# Patient Record
Sex: MALE | Race: WHITE | Employment: OTHER | URBAN - METROPOLITAN AREA
[De-identification: names, ages, dates, MRNs, and addresses within clinical notes are randomized per-mention and may not be internally consistent; named-entity substitution may affect disease eponyms.]

---

## 2019-01-07 ENCOUNTER — HOSPITAL ENCOUNTER (OUTPATIENT)
Dept: LAB | Age: 69
Discharge: HOME | End: 2019-01-07

## 2019-01-07 PROCEDURE — 88305 TISSUE EXAM BY PATHOLOGIST: CPT

## 2021-04-22 ENCOUNTER — APPOINTMENT (OUTPATIENT)
Dept: MRI IMAGING | Age: 71
DRG: 066 | End: 2021-04-22
Attending: PSYCHIATRY & NEUROLOGY
Payer: MEDICARE

## 2021-04-22 ENCOUNTER — APPOINTMENT (OUTPATIENT)
Dept: CT IMAGING | Age: 71
DRG: 066 | End: 2021-04-22
Attending: EMERGENCY MEDICINE
Payer: MEDICARE

## 2021-04-22 ENCOUNTER — HOSPITAL ENCOUNTER (INPATIENT)
Age: 71
LOS: 1 days | Discharge: HOME | DRG: 066 | End: 2021-04-23
Attending: EMERGENCY MEDICINE | Admitting: INTERNAL MEDICINE
Payer: MEDICARE

## 2021-04-22 DIAGNOSIS — R29.90 STROKE-LIKE SYMPTOMS: ICD-10-CM

## 2021-04-22 DIAGNOSIS — R42 DIZZINESS: ICD-10-CM

## 2021-04-22 DIAGNOSIS — R27.0 ATAXIA: Primary | ICD-10-CM

## 2021-04-22 DIAGNOSIS — I63.9 ACUTE STROKE OF MEDULLA OBLONGATA (HCC): ICD-10-CM

## 2021-04-22 DIAGNOSIS — I10 ESSENTIAL HYPERTENSION: ICD-10-CM

## 2021-04-22 LAB
ANION GAP SERPL CALC-SCNC: 4 MMOL/L (ref 7–16)
ATRIAL RATE: 57 BPM
BASOPHILS # BLD: 0 K/UL (ref 0–0.2)
BASOPHILS NFR BLD: 0 % (ref 0–2)
BUN SERPL-MCNC: 26 MG/DL (ref 8–23)
CALCIUM SERPL-MCNC: 9.4 MG/DL (ref 8.3–10.4)
CALCULATED P AXIS, ECG09: 73 DEGREES
CALCULATED R AXIS, ECG10: -11 DEGREES
CALCULATED T AXIS, ECG11: 56 DEGREES
CHLORIDE SERPL-SCNC: 109 MMOL/L (ref 98–107)
CO2 SERPL-SCNC: 29 MMOL/L (ref 21–32)
CREAT SERPL-MCNC: 0.96 MG/DL (ref 0.8–1.5)
DIAGNOSIS, 93000: NORMAL
DIFFERENTIAL METHOD BLD: ABNORMAL
EOSINOPHIL # BLD: 0.1 K/UL (ref 0–0.8)
EOSINOPHIL NFR BLD: 2 % (ref 0.5–7.8)
ERYTHROCYTE [DISTWIDTH] IN BLOOD BY AUTOMATED COUNT: 12.8 % (ref 11.9–14.6)
GLUCOSE BLD STRIP.AUTO-MCNC: 74 MG/DL (ref 65–100)
GLUCOSE SERPL-MCNC: 84 MG/DL (ref 65–100)
HCT VFR BLD AUTO: 45.4 % (ref 41.1–50.3)
HGB BLD-MCNC: 15.4 G/DL (ref 13.6–17.2)
IMM GRANULOCYTES # BLD AUTO: 0 K/UL (ref 0–0.5)
IMM GRANULOCYTES NFR BLD AUTO: 0 % (ref 0–5)
INR BLD: 1 (ref 0.9–1.2)
INR PPP: 1
LYMPHOCYTES # BLD: 1.9 K/UL (ref 0.5–4.6)
LYMPHOCYTES NFR BLD: 40 % (ref 13–44)
MCH RBC QN AUTO: 30 PG (ref 26.1–32.9)
MCHC RBC AUTO-ENTMCNC: 33.9 G/DL (ref 31.4–35)
MCV RBC AUTO: 88.5 FL (ref 79.6–97.8)
MONOCYTES # BLD: 0.6 K/UL (ref 0.1–1.3)
MONOCYTES NFR BLD: 13 % (ref 4–12)
NEUTS SEG # BLD: 2.1 K/UL (ref 1.7–8.2)
NEUTS SEG NFR BLD: 45 % (ref 43–78)
NRBC # BLD: 0 K/UL (ref 0–0.2)
P-R INTERVAL, ECG05: 206 MS
PLATELET # BLD AUTO: 186 K/UL (ref 150–450)
PMV BLD AUTO: 9.6 FL (ref 9.4–12.3)
POTASSIUM SERPL-SCNC: 4.1 MMOL/L (ref 3.5–5.1)
PROTHROMBIN TIME: 13.4 SEC (ref 12.5–14.7)
PT BLD: 12.4 SECS (ref 9.6–11.6)
Q-T INTERVAL, ECG07: 410 MS
QRS DURATION, ECG06: 92 MS
QTC CALCULATION (BEZET), ECG08: 399 MS
RBC # BLD AUTO: 5.13 M/UL (ref 4.23–5.6)
SERVICE CMNT-IMP: NORMAL
SODIUM SERPL-SCNC: 142 MMOL/L (ref 138–145)
T4 FREE SERPL-MCNC: 0.7 NG/DL (ref 0.78–1.46)
TROPONIN-HIGH SENSITIVITY: 5.5 PG/ML (ref 0–14)
TSH SERPL DL<=0.005 MIU/L-ACNC: 0.95 UIU/ML (ref 0.36–3.74)
VENTRICULAR RATE, ECG03: 57 BPM
WBC # BLD AUTO: 4.8 K/UL (ref 4.3–11.1)

## 2021-04-22 PROCEDURE — 70498 CT ANGIOGRAPHY NECK: CPT

## 2021-04-22 PROCEDURE — 74011000258 HC RX REV CODE- 258: Performed by: EMERGENCY MEDICINE

## 2021-04-22 PROCEDURE — 99285 EMERGENCY DEPT VISIT HI MDM: CPT

## 2021-04-22 PROCEDURE — 82962 GLUCOSE BLOOD TEST: CPT

## 2021-04-22 PROCEDURE — 93005 ELECTROCARDIOGRAM TRACING: CPT | Performed by: EMERGENCY MEDICINE

## 2021-04-22 PROCEDURE — 74011250637 HC RX REV CODE- 250/637: Performed by: INTERNAL MEDICINE

## 2021-04-22 PROCEDURE — 74011250636 HC RX REV CODE- 250/636: Performed by: INTERNAL MEDICINE

## 2021-04-22 PROCEDURE — 85610 PROTHROMBIN TIME: CPT

## 2021-04-22 PROCEDURE — C8929 TTE W OR WO FOL WCON,DOPPLER: HCPCS

## 2021-04-22 PROCEDURE — APPNB45 APP NON BILLABLE 31-45 MINUTES: Performed by: NURSE PRACTITIONER

## 2021-04-22 PROCEDURE — 70450 CT HEAD/BRAIN W/O DYE: CPT

## 2021-04-22 PROCEDURE — 84484 ASSAY OF TROPONIN QUANT: CPT

## 2021-04-22 PROCEDURE — 74011000636 HC RX REV CODE- 636: Performed by: EMERGENCY MEDICINE

## 2021-04-22 PROCEDURE — 36415 COLL VENOUS BLD VENIPUNCTURE: CPT

## 2021-04-22 PROCEDURE — 4A03X5D MEASUREMENT OF ARTERIAL FLOW, INTRACRANIAL, EXTERNAL APPROACH: ICD-10-PCS | Performed by: RADIOLOGY

## 2021-04-22 PROCEDURE — 84443 ASSAY THYROID STIM HORMONE: CPT

## 2021-04-22 PROCEDURE — 2709999900 HC NON-CHARGEABLE SUPPLY

## 2021-04-22 PROCEDURE — 70551 MRI BRAIN STEM W/O DYE: CPT

## 2021-04-22 PROCEDURE — 85025 COMPLETE CBC W/AUTO DIFF WBC: CPT

## 2021-04-22 PROCEDURE — 84439 ASSAY OF FREE THYROXINE: CPT

## 2021-04-22 PROCEDURE — 80048 BASIC METABOLIC PNL TOTAL CA: CPT

## 2021-04-22 PROCEDURE — 65660000000 HC RM CCU STEPDOWN

## 2021-04-22 PROCEDURE — 74011000250 HC RX REV CODE- 250: Performed by: INTERNAL MEDICINE

## 2021-04-22 PROCEDURE — 74011250637 HC RX REV CODE- 250/637: Performed by: EMERGENCY MEDICINE

## 2021-04-22 PROCEDURE — 99223 1ST HOSP IP/OBS HIGH 75: CPT | Performed by: PSYCHIATRY & NEUROLOGY

## 2021-04-22 RX ORDER — SODIUM CHLORIDE 0.9 % (FLUSH) 0.9 %
5-40 SYRINGE (ML) INJECTION EVERY 8 HOURS
Status: DISCONTINUED | OUTPATIENT
Start: 2021-04-22 | End: 2021-04-23 | Stop reason: HOSPADM

## 2021-04-22 RX ORDER — ACETAMINOPHEN 325 MG/1
650 TABLET ORAL
Status: DISCONTINUED | OUTPATIENT
Start: 2021-04-22 | End: 2021-04-22

## 2021-04-22 RX ORDER — SODIUM CHLORIDE 0.9 % (FLUSH) 0.9 %
10 SYRINGE (ML) INJECTION
Status: COMPLETED | OUTPATIENT
Start: 2021-04-22 | End: 2021-04-22

## 2021-04-22 RX ORDER — ACETAMINOPHEN 325 MG/1
1300 TABLET ORAL 3 TIMES DAILY
COMMUNITY

## 2021-04-22 RX ORDER — SODIUM CHLORIDE 0.9 % (FLUSH) 0.9 %
5-40 SYRINGE (ML) INJECTION AS NEEDED
Status: DISCONTINUED | OUTPATIENT
Start: 2021-04-22 | End: 2021-04-23 | Stop reason: HOSPADM

## 2021-04-22 RX ORDER — ONDANSETRON 2 MG/ML
4 INJECTION INTRAMUSCULAR; INTRAVENOUS
Status: DISCONTINUED | OUTPATIENT
Start: 2021-04-22 | End: 2021-04-23 | Stop reason: HOSPADM

## 2021-04-22 RX ORDER — CLOPIDOGREL BISULFATE 75 MG/1
300 TABLET ORAL
Status: COMPLETED | OUTPATIENT
Start: 2021-04-22 | End: 2021-04-22

## 2021-04-22 RX ORDER — LORATADINE 10 MG/1
10 TABLET ORAL DAILY
Status: DISCONTINUED | OUTPATIENT
Start: 2021-04-23 | End: 2021-04-23 | Stop reason: HOSPADM

## 2021-04-22 RX ORDER — ACETAMINOPHEN 650 MG/1
650 SUPPOSITORY RECTAL
Status: DISCONTINUED | OUTPATIENT
Start: 2021-04-22 | End: 2021-04-22

## 2021-04-22 RX ORDER — POLYETHYLENE GLYCOL 3350 17 G/17G
17 POWDER, FOR SOLUTION ORAL DAILY PRN
Status: DISCONTINUED | OUTPATIENT
Start: 2021-04-22 | End: 2021-04-23 | Stop reason: HOSPADM

## 2021-04-22 RX ORDER — HYDRALAZINE HYDROCHLORIDE 20 MG/ML
20 INJECTION INTRAMUSCULAR; INTRAVENOUS
Status: DISCONTINUED | OUTPATIENT
Start: 2021-04-22 | End: 2021-04-23 | Stop reason: HOSPADM

## 2021-04-22 RX ORDER — GUAIFENESIN 100 MG/5ML
81 LIQUID (ML) ORAL ONCE
Status: COMPLETED | OUTPATIENT
Start: 2021-04-22 | End: 2021-04-22

## 2021-04-22 RX ORDER — SODIUM CHLORIDE 0.9 % (FLUSH) 0.9 %
5-40 SYRINGE (ML) INJECTION AS NEEDED
Status: DISCONTINUED | OUTPATIENT
Start: 2021-04-22 | End: 2021-04-23

## 2021-04-22 RX ORDER — SODIUM CHLORIDE 0.9 % (FLUSH) 0.9 %
5-40 SYRINGE (ML) INJECTION EVERY 8 HOURS
Status: DISCONTINUED | OUTPATIENT
Start: 2021-04-22 | End: 2021-04-23

## 2021-04-22 RX ORDER — ATORVASTATIN CALCIUM 40 MG/1
40 TABLET, FILM COATED ORAL
Status: DISCONTINUED | OUTPATIENT
Start: 2021-04-22 | End: 2021-04-22 | Stop reason: SDUPTHER

## 2021-04-22 RX ORDER — AMLODIPINE BESYLATE 2.5 MG/1
2.5 TABLET ORAL DAILY
Status: ON HOLD | COMMUNITY
End: 2021-04-23 | Stop reason: SDUPTHER

## 2021-04-22 RX ORDER — GUAIFENESIN 100 MG/5ML
81 LIQUID (ML) ORAL DAILY
Status: DISCONTINUED | OUTPATIENT
Start: 2021-04-23 | End: 2021-04-23 | Stop reason: HOSPADM

## 2021-04-22 RX ORDER — GUAIFENESIN 100 MG/5ML
81 LIQUID (ML) ORAL DAILY
Status: DISCONTINUED | OUTPATIENT
Start: 2021-04-23 | End: 2021-04-22

## 2021-04-22 RX ORDER — ACETAMINOPHEN 325 MG/1
1300 TABLET ORAL 3 TIMES DAILY
Status: DISCONTINUED | OUTPATIENT
Start: 2021-04-22 | End: 2021-04-23 | Stop reason: HOSPADM

## 2021-04-22 RX ORDER — ATORVASTATIN CALCIUM 40 MG/1
40 TABLET, FILM COATED ORAL
Status: DISCONTINUED | OUTPATIENT
Start: 2021-04-22 | End: 2021-04-23 | Stop reason: HOSPADM

## 2021-04-22 RX ORDER — DICLOFENAC SODIUM 10 MG/G
1 GEL TOPICAL 4 TIMES DAILY
COMMUNITY

## 2021-04-22 RX ORDER — PROMETHAZINE HYDROCHLORIDE 25 MG/1
12.5 TABLET ORAL
Status: DISCONTINUED | OUTPATIENT
Start: 2021-04-22 | End: 2021-04-23 | Stop reason: HOSPADM

## 2021-04-22 RX ADMIN — ASPIRIN 81 MG: 81 TABLET, CHEWABLE ORAL at 13:42

## 2021-04-22 RX ADMIN — ATORVASTATIN CALCIUM 40 MG: 40 TABLET, FILM COATED ORAL at 22:20

## 2021-04-22 RX ADMIN — PERFLUTREN 1 ML: 6.52 INJECTION, SUSPENSION INTRAVENOUS at 16:00

## 2021-04-22 RX ADMIN — Medication 10 ML: at 22:21

## 2021-04-22 RX ADMIN — ACETAMINOPHEN 1300 MG: 325 TABLET, FILM COATED ORAL at 17:20

## 2021-04-22 RX ADMIN — ACETAMINOPHEN 1300 MG: 325 TABLET, FILM COATED ORAL at 22:20

## 2021-04-22 RX ADMIN — Medication 10 ML: at 17:20

## 2021-04-22 RX ADMIN — SODIUM CHLORIDE 100 ML: 900 INJECTION, SOLUTION INTRAVENOUS at 12:01

## 2021-04-22 RX ADMIN — Medication 10 ML: at 17:21

## 2021-04-22 RX ADMIN — IOPAMIDOL 50 ML: 755 INJECTION, SOLUTION INTRAVENOUS at 12:01

## 2021-04-22 RX ADMIN — CLOPIDOGREL BISULFATE 300 MG: 75 TABLET ORAL at 12:29

## 2021-04-22 RX ADMIN — Medication 10 ML: at 12:01

## 2021-04-22 NOTE — PROGRESS NOTES
04/22/21 1922   NIH Stroke Scale   Interval Other (comment)  (NIH at bedside w/ Ailyn Skaggs RN)   LOC 0   LOC Questions 0   LOC Commands 0   Best Gaze 0   Visual 0   Facial Palsy 0   Motor Right Arm 0   Motor Left Arm 0   Motor Right Leg 0   Motor Left Leg 0   Limb Ataxia 0   Sensory 0   Best Language 0   Dysarthria 0   Extinction and Inattention 0   Total 0

## 2021-04-22 NOTE — ED NOTES
TRANSFER - OUT REPORT:    Verbal report given to Joyce(name) on David Orozco  being transferred to 726(unit) for routine progression of care       Report consisted of patients Situation, Background, Assessment and   Recommendations(SBAR). Information from the following report(s) SBAR, ED Summary, [facility] and Recent Results was reviewed with the receiving nurse. Lines:   Peripheral IV 04/22/21 Left Antecubital (Active)        Opportunity for questions and clarification was provided.       Patient transported with:   Monitor  Registered Nurse

## 2021-04-22 NOTE — PROGRESS NOTES
04/22/21 1428   NIH Stroke Scale   Interval Other (comment)  (NIH at bedside w/ CINDY Anguiano)   LOC 0   LOC Questions 0   LOC Commands 0   Best Gaze 0   Visual 0   Facial Palsy 0   Motor Right Arm 0   Motor Left Arm 0   Motor Right Leg 0   Motor Left Leg 0   Limb Ataxia 0   Sensory 0   Best Language 0   Dysarthria 0   Extinction and Inattention 0   Total 0

## 2021-04-22 NOTE — PROGRESS NOTES
TRANSFER - IN REPORT:    Verbal report received from Shital Choi RN on [facility]  being received from ED for routine progression of care      Report consisted of patients Situation, Background, Assessment and   Recommendations(SBAR). Information from the following report(s) SBAR was reviewed with the receiving nurse. Opportunity for questions and clarification was provided. Assessment completed upon patients arrival to unit and care assumed. Virtual Regular Visit      Assessment/Plan:    Problem List Items Addressed This Visit        Respiratory    Mild intermittent asthma without complication - Primary     D/w pt that asthma is NOT a contraindication to wear a mask and is actually a dx that we should stress she should wear a mask, she has tried mult types of mask but feels she cannot breath when it covers her nose, work will allow her to wear a face mask, d/w pt that face masks alone are not as good as mask but I would allow her to wear a face mask WITH a face shield, forms filled out and will be faxed, call with any new/worse resp symptoms                    Reason for visit is   Chief Complaint   Patient presents with    Follow-up    Virtual Regular Visit        Encounter provider Jose D Hannah DO    Provider located at [address] [place] [address]  [address] [place] [address]      Recent Visits  No visits were found meeting these conditions  Showing recent visits within past 7 days and meeting all other requirements     Future Appointments  No visits were found meeting these conditions  Showing future appointments within next 150 days and meeting all other requirements        The patient was identified by name and date of birth  Camron Mcmillan was informed that this is a telemedicine visit and that the visit is being conducted through [employer] and patient was informed that this is not a secure, HIPAA-compliant platform  She agrees to proceed     My office door was closed  No one else was in the room  She acknowledged consent and understanding of privacy and security of the video platform  The patient has agreed to participate and understands they can discontinue the visit at any time  Patient is aware this is a billable service       Subjective  Camron Mcmillan is a 54 y o  female contacted via [employer] to go over questions in regards to disability forms that were dropped off since our last visit       HPI Last visit 3/23/21 pt was noting worsening acute on chronic LBP w/o sciatica or radicular symptoms  We subsequently increased her Lyrica to 75 mg bid and advised her to con't her Cymbalta  A trial of Skelaxin was given  Xray of hip/pelvix and lumbar spine was ordered  Pt was given number to pain mgt as well  The next day after our visit we received a fax from pts work regarding disability  Disability was not discussed at previous appt  Pt contacted today to review disability forms and to discuss further  Pt states the disability forms are d/t her inability to wear a mask d/t her asthma  She states she was dx with asthma as a teen  She states she had PFTs done in the past  She states her asthma is only an issue with illness or exercise  She feels when she wears a mask at [facility] and the mask covers her nose it makes her "asthma flare up"  She can tolerate the mask over just her mouth but company policy will not allow this  She only tx her asthma with a rescue inhaler prn        Past Medical History:   Diagnosis Date    Anxiety     Arthritis     Asthma     Depression     Disc disorder     Fibromyalgia     Hypertension     Hypothyroidism     Migraine        Past Surgical History:   Procedure Laterality Date    BACK SURGERY      CERVICAL BIOPSY  W/ LOOP ELECTRODE EXCISION      CERVICAL FUSION       SECTION      CHOLECYSTECTOMY      FOOT SURGERY Bilateral     KNEE SURGERY      TUBAL LIGATION Bilateral        Current Outpatient Medications   Medication Sig Dispense Refill    albuterol (VENTOLIN HFA) 90 mcg/act inhaler Inhale 2 puffs every 6 (six) hours as needed for wheezing 18 g 0    amLODIPine (NORVASC) 10 mg tablet Take 1 tablet (10 mg total) by mouth daily 90 tablet 1    Aspirin-Acetaminophen-Caffeine (EXCEDRIN PO) Take by mouth      cholecalciferol (VITAMIN D3) 1,000 units tablet Take 2,000 Units by mouth daily      DULoxetine (CYMBALTA) 60 mg delayed release capsule Take 1 capsule (60 mg total) by mouth daily 90 capsule 0    famotidine (PEPCID) 20 mg tablet Take 1 tablet (20 mg total) by mouth 2 (two) times a day 180 tablet 1    levothyroxine 200 mcg tablet Take 1 tablet (200 mcg total) by mouth daily 90 tablet 0    levothyroxine 25 mcg tablet 25 mcg 1 tab PO q am with 200 mcg on Mon, Wed, Fri, Sun ONLY , con't 200 mcg all other days 90 tablet 0    metaxalone (Skelaxin) 800 mg tablet Take 1 tablet (800 mg total) by mouth 3 (three) times a day as needed for muscle spasms 30 tablet 1    multivitamin (THERAGRAN) TABS Take 1 tablet by mouth daily      ondansetron (ZOFRAN-ODT) 4 mg disintegrating tablet Take 1 tablet (4 mg total) by mouth every 8 (eight) hours as needed for nausea or vomiting 12 tablet 0    pregabalin (LYRICA) 75 mg capsule Take 1 in AM and 2 PO HS  90 capsule 1     No current facility-administered medications for this visit  Allergies   Allergen Reactions    Acetaminophen-Codeine      Pt states she does not remember having a reaction to this medication    Hydrocodone Other (See Comments)    Latex Hives     Latex powder      Prednisolone        Review of Systems   Constitutional: Negative for chills and fever  Respiratory: Negative for cough and shortness of breath  Musculoskeletal: Positive for arthralgias and back pain  Video Exam    Vitals:    03/30/21 1733 03/30/21 1740   BP:  126/86   Temp:  (!) 97 4 °F (36 3 °C)   Weight: 127 kg (280 lb)    Height: 5' 10" (1 778 m)        Physical Exam  Vitals signs and nursing note reviewed  Constitutional:       General: She is not in acute distress  Appearance: She is not ill-appearing  HENT:      Head: Normocephalic  Pulmonary:      Effort: Pulmonary effort is normal  No respiratory distress  Skin:     Coloration: Skin is not pale  Findings: No rash  Psychiatric:         Mood and Affect: Mood normal          Behavior: Behavior normal          Thought Content:  Thought content normal          Judgment: Judgment normal           I spent 10 minutes with patient today in which greater than 50% of the time was spent in counseling/coordination of care regarding asthma and mask recommendations      VIRTUAL VISIT DISCLAIMER    Houston Dave acknowledges that she has consented to an online visit or consultation  She understands that the online visit is based solely on information provided by her, and that, in the absence of a face-to-face physical evaluation by the physician, the diagnosis she receives is both limited and provisional in terms of accuracy and completeness  This is not intended to replace a full medical face-to-face evaluation by the physician  Dario Acosta understands and accepts these terms

## 2021-04-22 NOTE — ED PROVIDER NOTES
76-year-old male presents with intermittent episodes of dizziness for the past 3 days. He woke up in the morning 2 days neuro with dizziness, but it resolved after walking around for a while. He went to bed around 10 PM last night feeling normal.  He woke up at 6 AM with dizziness and difficulty walking. It has not gone away. He denies headache, chest pain, shortness of breath. No symptoms while sitting. Denies history of vertigo. He was told he had a TIA several years ago with similar symptoms. He was seen in urgent care and sent immediately to the emergency department this morning. Denies focal numbness or weakness. Dizziness  Pertinent negatives include no speech difficulty. Pertinent negatives include no shortness of breath, no chest pain, no vomiting, no confusion, no headaches and no nausea. Past Medical History:   Diagnosis Date    Hypertension     Other Ill-Defined Conditions     high cholesterol       Past Surgical History:   Procedure Laterality Date    ABDOMEN SURGERY PROC UNLISTED      henia repair    HX ORTHOPAEDIC      right knee, back    HX PROSTATECTOMY           No family history on file.     Social History     Socioeconomic History    Marital status:      Spouse name: Not on file    Number of children: Not on file    Years of education: Not on file    Highest education level: Not on file   Occupational History    Not on file   Social Needs    Financial resource strain: Not on file    Food insecurity     Worry: Not on file     Inability: Not on file    Transportation needs     Medical: Not on file     Non-medical: Not on file   Tobacco Use    Smoking status: Never Smoker   Substance and Sexual Activity    Alcohol use: No    Drug use: No    Sexual activity: Never   Lifestyle    Physical activity     Days per week: Not on file     Minutes per session: Not on file    Stress: Not on file   Relationships    Social connections     Talks on phone: Not on file Gets together: Not on file     Attends [employer] service: Not on file     Active member of club or organization: Not on file     Attends meetings of clubs or organizations: Not on file     Relationship status: Not on file    Intimate partner violence     Fear of current or ex partner: Not on file     Emotionally abused: Not on file     Physically abused: Not on file     Forced sexual activity: Not on file   Other Topics Concern    Not on file   Social History Narrative    Not on file         ALLERGIES: Patient has no known allergies. Review of Systems   Constitutional: Negative for fever. HENT: Negative for hearing loss. Eyes: Negative for visual disturbance. Respiratory: Negative for cough and shortness of breath. Cardiovascular: Negative for chest pain. Gastrointestinal: Negative for abdominal pain, diarrhea, nausea and vomiting. Musculoskeletal: Negative for back pain. Skin: Negative for rash. Neurological: Positive for dizziness. Negative for syncope, speech difficulty, weakness, numbness and headaches. Psychiatric/Behavioral: Negative for confusion. All other systems reviewed and are negative. Vitals:    04/22/21 1144   BP: (!) 156/85   Pulse: 65   Resp: 16   Temp: 98.8 °F (37.1 °C)   SpO2: 98%   Weight: 90.3 kg (199 lb)   Height: 5' 7\" (1.702 m)            Physical Exam  Vitals signs and nursing note reviewed. Constitutional:       Appearance: Normal appearance. He is well-developed. HENT:      Head: Normocephalic and atraumatic. Nose: Nose normal.      Mouth/Throat:      Mouth: Mucous membranes are moist.   Eyes:      Extraocular Movements: Extraocular movements intact. Right eye: No nystagmus. Left eye: No nystagmus. Pupils: Pupils are equal, round, and reactive to light. Neck:      Musculoskeletal: Normal range of motion and neck supple. Cardiovascular:      Rate and Rhythm: Regular rhythm. Heart sounds: Normal heart sounds.    Pulmonary: Effort: Pulmonary effort is normal.      Breath sounds: Normal breath sounds. Abdominal:      Palpations: Abdomen is soft. Tenderness: There is no abdominal tenderness. Musculoskeletal: Normal range of motion. General: No deformity. Skin:     General: Skin is warm and dry. Neurological:      General: No focal deficit present. Mental Status: He is alert. Mental status is at baseline. Cranial Nerves: No cranial nerve deficit. Sensory: No sensory deficit. Motor: Motor function is intact. Coordination: Coordination abnormal. Finger-Nose-Finger Test normal.      Gait: Gait abnormal.   Psychiatric:         Mood and Affect: Mood normal.         Behavior: Behavior normal.          MDM  Number of Diagnoses or Management Options  Diagnosis management comments: Parts of this document were created using dragon voice recognition software. The chart has been reviewed but errors may still be present. I wore appropriate PPE throughout this patient's ED visit. Rayo Clement MD, 11:51 AM    Code stroke called    12:19 PM  CT neg for acute CVA. NOT a TPA candidate due to onset time out of window. Evaluated by neuro, Dr Indu Rivera. Recommended hospitalist admission.        Amount and/or Complexity of Data Reviewed  Clinical lab tests: reviewed and ordered (Results for orders placed or performed during the hospital encounter of 04/22/21  -CBC WITH AUTOMATED DIFF       Result                      Value             Ref Range           WBC                         4.8               4.3 - 11.1 K*       RBC                         5.13              4.23 - 5.6 M*       HGB                         15.4              13.6 - 17.2 *       HCT                         45.4              41.1 - 50.3 %       MCV                         88.5              79.6 - 97.8 *       MCH                         30.0              26.1 - 32.9 *       MCHC                        33.9              31.4 - 35.0 * RDW                         12.8              11.9 - 14.6 %       PLATELET                    186               150 - 450 K/*       MPV                         9.6               9.4 - 12.3 FL       ABSOLUTE NRBC               0.00              0.0 - 0.2 K/*       DF                          AUTOMATED                             NEUTROPHILS                 45                43 - 78 %           LYMPHOCYTES                 40                13 - 44 %           MONOCYTES                   13 (H)            4.0 - 12.0 %        EOSINOPHILS                 2                 0.5 - 7.8 %         BASOPHILS                   0                 0.0 - 2.0 %         IMMATURE GRANULOCYTES       0                 0.0 - 5.0 %         ABS. NEUTROPHILS            2.1               1.7 - 8.2 K/*       ABS. LYMPHOCYTES            1.9               0.5 - 4.6 K/*       ABS. MONOCYTES              0.6               0.1 - 1.3 K/*       ABS. EOSINOPHILS            0.1               0.0 - 0.8 K/*       ABS. BASOPHILS              0.0               0.0 - 0.2 K/*       ABS. IMM. GRANS.            0.0               0.0 - 0.5 K/*  -POC PT/INR       Result                      Value             Ref Range           Prothrombin time (POC)      12.4 (H)          9.6 - 11.6 S*       INR (POC)                   1.0               0.9 - 1.2      -GLUCOSE, POC       Result                      Value             Ref Range           Glucose (POC)               74                65 - 100 mg/*       Performed by                                                  Tamara Brasher  )  Tests in the radiology section of CPT®: ordered and reviewed (Ct Code Neuro Head Wo Contrast    Result Date: 4/22/2021  History: patient with acute neuro changes. Dizziness, lightheadedness. Exam: CT head without contrast Technique: Thin section axial CT images were obtained from the skullbase through the vertex. Radiation dose reduction techniques were used for this study.   Our CT scanners use one or all of the following: Automated exposure control, adjustment of the mA and/or kV according to patient size, use of iterative reconstruction. COMPARISON: 10/3/2009 Findings: The ventricles are normal in size, shape, and position. Chronic appearing white matter change noted in the corona radiata and centrum semiovale, mild. No extra-axial fluid collection is present. There is no mass or mass-effect. The basilar cisterns are patent. The paranasal sinuses and mastoid air cells are clear.      Chronic appearing white matter change without acute intracranial abnormality.     )           Critical Care  Performed by: Edward Sweeney MD  Authorized by: Edward Sweeney MD     Critical care provider statement:     Critical care time (minutes):  30    Critical care time was exclusive of:  Separately billable procedures and treating other patients    Critical care was necessary to treat or prevent imminent or life-threatening deterioration of the following conditions:  CNS failure or compromise    Critical care was time spent personally by me on the following activities:  Development of treatment plan with patient or surrogate, discussions with consultants, examination of patient, ordering and performing treatments and interventions, ordering and review of laboratory studies, ordering and review of radiographic studies, pulse oximetry, re-evaluation of patient's condition and review of old charts    I assumed direction of critical care for this patient from another provider in my specialty: yes      EKG    Date/Time: 4/22/2021 12:27 PM  Performed by: Edward Sweeney MD  Authorized by: Edward Sweeney MD     ECG reviewed by ED Physician in the absence of a cardiologist: yes    Interpretation:     Interpretation: normal    Rate:     ECG rate:  57    ECG rate assessment: bradycardic    Rhythm:     Rhythm: sinus bradycardia    Ectopy:     Ectopy: none    Conduction:     Conduction: normal    ST segments: ST segments:  Normal

## 2021-04-22 NOTE — H&P
[employer] Hospitalist History and Physical       Name:  Heriberto Gomez  Age:71 y.o. Sex:male   :  1950    MRN:  [id number]   PCP:  Asif Thompson MD      Admit Date:  2021 11:46 AM   Chief Complaint: dizziness    Reason for Admission:   Stroke [facility]) [I63.9]    Assessment & Plan:     # Dizziness, r/o TIA vs CVA  - continue asa (pt has not been taking plavix at home)  - cont statin  - CTA no LVO, CT head non con non acute  - MRI head pending  - Echo pending  - PT/OT, PPD, speech, CM     # right thyroid nodule  - will need outpatient thryoid US for further workup  - will check TFT    # HTN  - holding home meds for permissive HTN     # HLP  - continue statin, check lipids     Disposition/Expected LOS: 2-3 days  Diet: DIET CARDIAC  VTE ppx: scds  Code status Full code        History of Presenting Illness:     Heriberto Gomez is a 66 y.o. male with medical history of hypertension, hyperlipidemia, distant history of TIA who presented to ED with complaints of dizziness. Patient states he has been feeling dizzy for the last 3 mornings upon waking up. Patient reports that his dizziness first 2 days lasted only a few minutes of the time he walked out of his bedroom and had resolved. He cannot characterize dizziness is spinning but seems more a lightheaded sensation. This morning he reports this issue was much worse in the previous 2 days. Reports his dizziness lasted for a few hours. Says he was having trouble coordinating his legs while trying to walk. Denies any changes in vision, headache, fever, chills, difficulty with speech or swallow. No changes in medications recently. Reports a similar episode more than a decade ago and was diagnosed as having a TIA. Says he still takes his aspirin but has not been taking his Plavix.     ER work-up notable for benign CBC, benign BMP      Hospitalist asked to admit for TIA    Review of Systems:  A 14 point review of systems was taken and pertinent positive as per HPI. Past Medical History:   Diagnosis Date    Hypertension     Other Ill-Defined Conditions     high cholesterol       Past Surgical History:   Procedure Laterality Date    ABDOMEN SURGERY PROC UNLISTED      henia repair    HX ORTHOPAEDIC      right knee, back    HX PROSTATECTOMY         Family History : reviewed  No family history on file. Social History     Tobacco Use    Smoking status: Never Smoker   Substance Use Topics    Alcohol use: No       No Known Allergies      There is no immunization history on file for this patient. PTA Medications:  Current Outpatient Medications   Medication Instructions    acetaminophen (TYLENOL) 1,300 mg, Oral, 3 TIMES DAILY    amLODIPine (NORVASC) 2.5 mg, Oral, DAILY    aspirin 81 mg, Oral, DAILY    cetirizine (ZYRTEC) 10 mg, DAILY    clopidogreL (PLAVIX) 75 mg, Oral, DAILY    diclofenac (VOLTAREN) 1 g, Topical, 4 TIMES DAILY    hydroCHLOROthiazide (HYDRODIURIL) 12.5 mg, DAILY    SIMVASTATIN PO 40 mg    valsartan (DIOVAN) 160 mg tablet DAILY       Objective:     Patient Vitals for the past 24 hrs:   Temp Pulse Resp BP SpO2   04/22/21 1401  (!) 51 14 130/60 98 %   04/22/21 1344  (!) 54 21 137/75 97 %   04/22/21 1230  (!) 58 21 (!) 141/79 98 %   04/22/21 1223  61 24 (!) 149/89 98 %   04/22/21 1144 98.8 °F (37.1 °C) 65 16 (!) 156/85 98 %       Oxygen Therapy  O2 Sat (%): 98 % (04/22/21 1401)  Pulse via Oximetry: 52 beats per minute (04/22/21 1401)  O2 Device: None (Room air) (04/22/21 1144)    Body mass index is 31.17 kg/m².     Physical Exam:    General:  No acute distress, speaking in full sentences  HEENT:  Pupils equal and reactive to light and accommodation, oropharynx is clear   Neck:   Supple, no lymphadenopathy, no JVD   Lungs:  Clear to auscultation bilaterally   CV:   Regular rate and rhythm with normal S1 and S2   Abdomen:  Soft, nontender, nondistended, normoactive bowel sounds   Extremities:  No cyanosis clubbing or edema   Neuro:  Nonfocal, A&O x3   Psych:  Normal mood and affect       Data Reviewed: I have reviewed all labs, meds, and studies. Recent Results (from the past 24 hour(s))   GLUCOSE, POC    Collection Time: 04/22/21 11:47 AM   Result Value Ref Range    Glucose (POC) 74 65 - 100 mg/dL    Performed by Tutu    CBC WITH AUTOMATED DIFF    Collection Time: 04/22/21 11:49 AM   Result Value Ref Range    WBC 4.8 4.3 - 11.1 K/uL    RBC 5.13 4.23 - 5.6 M/uL    HGB 15.4 13.6 - 17.2 g/dL    HCT 45.4 41.1 - 50.3 %    MCV 88.5 79.6 - 97.8 FL    MCH 30.0 26.1 - 32.9 PG    MCHC 33.9 31.4 - 35.0 g/dL    RDW 12.8 11.9 - 14.6 %    PLATELET 334 757 - 497 K/uL    MPV 9.6 9.4 - 12.3 FL    ABSOLUTE NRBC 0.00 0.0 - 0.2 K/uL    DF AUTOMATED      NEUTROPHILS 45 43 - 78 %    LYMPHOCYTES 40 13 - 44 %    MONOCYTES 13 (H) 4.0 - 12.0 %    EOSINOPHILS 2 0.5 - 7.8 %    BASOPHILS 0 0.0 - 2.0 %    IMMATURE GRANULOCYTES 0 0.0 - 5.0 %    ABS. NEUTROPHILS 2.1 1.7 - 8.2 K/UL    ABS. LYMPHOCYTES 1.9 0.5 - 4.6 K/UL    ABS. MONOCYTES 0.6 0.1 - 1.3 K/UL    ABS. EOSINOPHILS 0.1 0.0 - 0.8 K/UL    ABS. BASOPHILS 0.0 0.0 - 0.2 K/UL    ABS. IMM.  GRANS. 0.0 0.0 - 0.5 K/UL   METABOLIC PANEL, BASIC    Collection Time: 04/22/21 11:49 AM   Result Value Ref Range    Sodium 142 138 - 145 mmol/L    Potassium 4.1 3.5 - 5.1 mmol/L    Chloride 109 (H) 98 - 107 mmol/L    CO2 29 21 - 32 mmol/L    Anion gap 4 (L) 7 - 16 mmol/L    Glucose 84 65 - 100 mg/dL    BUN 26 (H) 8 - 23 MG/DL    Creatinine 0.96 0.8 - 1.5 MG/DL    GFR est AA >60 >60 ml/min/1.73m2    GFR est non-AA >60 >60 ml/min/1.73m2    Calcium 9.4 8.3 - 10.4 MG/DL   PROTHROMBIN TIME + INR    Collection Time: 04/22/21 11:49 AM   Result Value Ref Range    Prothrombin time 13.4 12.5 - 14.7 sec    INR 1.0     TROPONIN-HIGH SENSITIVITY    Collection Time: 04/22/21 11:49 AM   Result Value Ref Range    Troponin-High Sensitivity 5.5 0 - 14 pg/mL   POC PT/INR    Collection Time: 04/22/21 11:50 AM   Result Value Ref Range    Prothrombin time (POC) 12.4 (H) 9.6 - 11.6 SECS    INR (POC) 1.0 0.9 - 1.2     EKG, 12 LEAD, INITIAL    Collection Time: 04/22/21 12:24 PM   Result Value Ref Range    Ventricular Rate 57 BPM    Atrial Rate 57 BPM    P-R Interval 206 ms    QRS Duration 92 ms    Q-T Interval 410 ms    QTC Calculation (Bezet) 399 ms    Calculated P Axis 73 degrees    Calculated R Axis -11 degrees    Calculated T Axis 56 degrees    Diagnosis       !! AGE AND GENDER SPECIFIC ECG ANALYSIS !! Sinus bradycardia  Otherwise normal ECG  When compared with ECG of 03-OCT-2009 08:57,  No significant change was found  Confirmed by Lucy Vogel MD ()RHEA ([id number]) on 4/22/2021 3:47:29 PM         EKG Results     Procedure 720 Value Units Date/Time    Initial ECG [[id number]] Collected: 04/22/21 1224    Order Status: Completed Updated: 04/22/21 1548     Ventricular Rate 57 BPM      Atrial Rate 57 BPM      P-R Interval 206 ms      QRS Duration 92 ms      Q-T Interval 410 ms      QTC Calculation (Bezet) 399 ms      Calculated P Axis 73 degrees      Calculated R Axis -11 degrees      Calculated T Axis 56 degrees      Diagnosis --     !! AGE AND GENDER SPECIFIC ECG ANALYSIS !! Sinus bradycardia  Otherwise normal ECG  When compared with ECG of 03-OCT-2009 08:57,  No significant change was found  Confirmed by Lucy Vogel MD ()RHEA ([id number]) on 4/22/2021 3:47:29 PM            All Micro Results     None          Other Studies:  Cta Code Neuro Head And Neck W Cont    Result Date: 4/22/2021  History: Venous. Lightheadedness. Comments: CT ANGIOGRAM OF THE NECK AND CT ANGIOGRAM OF THE [place] was obtained following the administration of IV contrast. IV contrast was administered to evaluate the arterial vasculature. Reformatted images in the coronal and sagittal planes as well as 3-D imaging was obtained and reviewed on a dedicated PACS and [address]. Radiation reduction dose techniques were used for the study.  Our CT scanner use one or all of the following- Automated exposure control, adjustment of the mA and/or KV according the patient size, iterative reconstruction. All measurements are based upon NASCET criteria if appropriate. This study was analyzed by the [address] [place], [place] [address]y Carlyn N. alexa.Algorithm Findings: CT ANGIOGRAM OF THE NECK: The arch and proximal great vessels are patent. The carotid bulbs are patent with mild eccentric calcified plaque disease. Degree of stenosis is minimal. The proximal vertebral arteries are patent. The lung apices are clear. Dominant right thyroid nodules noted. Recommend ultrasound further evaluate. Surgical changes are noted in cervical spine. CT ANGIOGRAM OF [place]: The petrous, cavernous, and supraclinoid internal carotid arteries are patent. The anterior and middle cerebral arteries are patent. The distal vertebral arteries, basilar artery and posterior cerebral arteries are patent. The distal P3 and P4 segments of the posterior circulation is somewhat diminutive in size. The graft the dural venous sinuses are not well visualized on this exam however grossly appear patent. Paranasal sinuses and orbits are unremarkable. 1. No evidence of large vessel occlusive disease or high-grade stenosis. 2. Dominant right thyroid nodule. Recommend ultrasound further evaluate and characterize. DC4 The significant findings in this report have been referred to the Imaging Navigator in order to communicate to the referring provider or his/her designee as outlined in Section II.C.2.a.ii-iii of the ACR Practice Guideline for Communication of Diagnostic Imaging Findings. Ct Code Neuro Head Wo Contrast    Result Date: 4/22/2021  History: patient with acute neuro changes. Dizziness, lightheadedness. Exam: CT head without contrast Technique: Thin section axial CT images were obtained from the skullbase through the vertex. Radiation dose reduction techniques were used for this study.   Our CT scanners use one or all of the following: Automated exposure control, adjustment of the mA and/or kV according to patient size, use of iterative reconstruction. COMPARISON: 10/3/2009 Findings: The ventricles are normal in size, shape, and position. Chronic appearing white matter change noted in the corona radiata and centrum semiovale, mild. No extra-axial fluid collection is present. There is no mass or mass-effect. The basilar cisterns are patent. The paranasal sinuses and mastoid air cells are clear. Chronic appearing white matter change without acute intracranial abnormality.          Medications:  Medications Administered      Medications Administered     aspirin chewable tablet 81 mg     Admin Date  04/22/2021 Action  Given Dose  81 mg Route  Oral Administered By  Kevin Baptiste, RN          clopidogreL (PLAVIX) tablet 300 mg     Admin Date  04/22/2021 Action  Given Dose  300 mg Route  Oral Administered By  Joan Snyder          iopamidoL (ISOVUE-370) 76 % injection 50 mL     Admin Date  04/22/2021 Action  Given Dose  50 mL Route  IntraVENous Administered By  Tiago BLOUNT, RT, R, CT          saline peripheral flush soln 10 mL     Admin Date  04/22/2021 Action  Given Dose  10 mL Route  InterCATHeter Administered By  Tiago BLOUNT, RT, R, CT          sodium chloride 0.9 % bolus infusion 100 mL     Admin Date  04/22/2021 Action  New Bag Dose  100 mL Route  IntraVENous Administered By  KERA Thomson, CT                      Problem List:     Hospital Problems as of 4/22/2021 Date Reviewed: 10/3/2009          Codes Class Noted - Resolved POA    Stroke [facility]) ICD-10-CM: I63.9  ICD-9-CM: 434.91  4/22/2021 - Present Unknown        Essential hypertension ICD-10-CM: I10  ICD-9-CM: 401.9  10/3/2009 - Present Yes        Hyperlipemia (Chronic) ICD-10-CM: E78.5  ICD-9-CM: 272.4  10/3/2009 - Present Yes                 Signed By: DO True [facility]    April 22, 2021  4:22 PM

## 2021-04-22 NOTE — ED TRIAGE NOTES
Pt ambulatory to triage. States for the last couple of mornings he has been dizzy when he wakes up but states it goes away. This morning he woke up around 0600 and felt light-headed again. NIH 0 in triage. Has hx of TIA. Takes a baby ASA daily. Dr Iman Green to triage.

## 2021-04-22 NOTE — ED NOTES
Patient report received from [facility]. Patient care to be assumed at this time.

## 2021-04-22 NOTE — CONSULTS
Consult    Patient: Keri Ramos MRN: [id number]     YOB: 1950  Age: 66 y.o. Sex: male      Subjective:      Keri Ramos is a 66 y.o. male who is being seen for code S. The patient was last known normal at 10 PM last night. The patient presented to [facility] ED. A code S was called at 11:46 AM.  Neurology arrived to the bedside at 11:47 AM.  Initial NIHSS was 0. A CT of the head was obtained and did not show acute intracranial pathology. On the morning of 4/21 the patient acute onset of dizziness which lasted a short while and then resolved. Today he woke up with the same symptoms but they have persisted throughout the morning so he went to the emergency department. The patient has a history of TIA from 2009 and was treated with dual antiplatelet therapy. Past Medical History:   Diagnosis Date    Hypertension     Other Ill-Defined Conditions     high cholesterol     Past Surgical History:   Procedure Laterality Date    ABDOMEN SURGERY PROC UNLISTED      henia repair    HX ORTHOPAEDIC      right knee, back    HX PROSTATECTOMY        No family history on file. Social History     Tobacco Use    Smoking status: Never Smoker   Substance Use Topics    Alcohol use: No      Current Facility-Administered Medications   Medication Dose Route Frequency Provider Last Rate Last Admin    [START ON 4/23/2021] aspirin chewable tablet 81 mg  81 mg Oral DAILY Huber Reed MD         Current Outpatient Medications   Medication Sig Dispense Refill    aspirin 81 mg chewable tablet Take 1 Tab by mouth daily. 30 Tab 5    clopidogrel (PLAVIX) 75 mg tablet Take 1 Tab by mouth daily. 30 Tab 5    SIMVASTATIN PO Take 40 mg by mouth.  Hydrochlorothiazide 12.5 mg tablet Take 12.5 mg by mouth daily.  valsartan (DIOVAN) 160 mg tablet Take  by mouth daily. Take one half tab by mouth every day       cetirizine (ZYRTEC) 10 mg tablet Take 10 mg by mouth daily.  Take one table daily No Known Allergies    Review of Systems:  Not obtained due to emergent situation         Objective:     Vitals:    04/22/21 1144 04/22/21 1223   BP: (!) 156/85 (!) 149/89   Pulse: 65 61   Resp: 16 24   Temp: 98.8 °F (37.1 °C)    SpO2: 98% 98%   Weight: 199 lb (90.3 kg)    Height: 5' 7\" (1.702 m)         Physical Exam:  General - Well developed, well nourished, in no apparent distress. Pleasant and conversent. HEENT - Normocephalic, atraumatic. Conjunctiva, tympanic membranes, and oropharynx are clear. Neck - Supple without masses, no bruits   Cardiovascular - Regular rate and rhythm. Normal S1, S2 without murmurs, rubs, or gallops. Lungs - Clear to auscultation. Abdomen - Soft, nontender with normal bowel sounds. Extremities - Peripheral pulses intact. No edema and no rashes. Neurological examination - Comprehension, attention, memory and reasoning are intact. Language and speech are normal.   On cranial nerve examination, (II, III, IV, VI) pupils are equal, round, and reactive to light. Fundoscopic exam is normal. Visual acuity is adequate. Visual fields are full to finger confrontation. Extraocular motility is normal. (V, VII) Face is symmetric and sensation is intact to light touch. (VIII) Hearing is intact. (IX, X) Palate and uvula elevate symmetrically. Voice is normal. (XI) Shoulder shrug is strong and equal bilaterally. (XII)Tongue is midline. Motor examination - There is normal muscle tone and bulk. Power is full throughout. Muscle stretch reflexes are normoactive and there are no pathological reflexes present. Plantar response is flexor. Sensation is intact to light touch, pinprick, vibration and proprioception in all extremities. Cerebellar examination is normal.  Stance was normal.  He took a few steps but then his dizziness worsened need to sit down.     NIHSS   NIHSS Score: 0  1a-Level of Consciousness 0  1b-What is Month/Age 0  1c-Open/Close Eyes&Hand 0  2 -Best Gaze 0  3 -Visual Fields 0  4 -Facial Palsy 0  5a-Motor-Left Arm 0  5b-Motor-Right Arm 0  6a-Motor-Left Leg 0  6b-Motor-Right Leg 0  7 -Limb Ataxia 0  8 -Sensory 0  9 -Best Language 0  10-Dysarthria 0  11-Extinction/Inattention 0    Lab Results   Component Value Date/Time    Cholesterol, total 158 10/04/2009 04:53 AM    HDL Cholesterol 37 (L) 10/04/2009 04:53 AM    LDL, calculated 94.2 10/04/2009 04:53 AM    VLDL, calculated 26.8 (H) 10/04/2009 04:53 AM    Triglyceride 134 10/04/2009 04:53 AM    CHOL/HDL Ratio 4.3 10/04/2009 04:53 AM          Images personally reviewed by me  CT Results (most recent):  Results from Hospital Encounter encounter on 04/22/21   CT CODE NEURO HEAD WO CONTRAST    Narrative History: patient with acute neuro changes. Dizziness, lightheadedness. Exam: CT head without contrast    Technique: Thin section axial CT images were obtained from the skullbase through  the vertex. Radiation dose reduction techniques were used for this study. Our  CT scanners use one or all of the following: Automated exposure control,  adjustment of the mA and/or kV according to patient size, use of iterative  reconstruction. COMPARISON: 10/3/2009    Findings: The ventricles are normal in size, shape, and position. Chronic  appearing white matter change noted in the corona radiata and centrum semiovale,  mild. No extra-axial fluid collection is present. There is no mass or  mass-effect. The basilar cisterns are patent. The paranasal sinuses and mastoid  air cells are clear. Impression Chronic appearing white matter change without acute intracranial  abnormality. Assessment:     76-year-old man seen as a code S with dizziness  Initial NIHSS was 0. CT of the head was obtained and shows only chronic changes. CTA of head neck was obtained obtained with results pending. No obvious LVO. The patient was not a candidate for alteplase because NIH stroke scale is 0.  The patient was not a candidate for mechanical thrombectomy, as no large vessel occlusion was identified on CTA. A central cause of dizziness and imbalance should be further investigated with brain MRI. Plan:     Load with Plavix 300 mg followed by 75 mg daily.   Aspirin 81 mg daily    Neurochecks Q4H    BMRI    Bedside swallow test     Labs: A1c, FLP    Telemetry and echocardiogram with bubble study    PT/OT/ST    Lovenox or heparin for DVT ppx     BP management - allow permissive HTN to 220/120, treat with labetalol 10 mg IV or nicardipine gtt    Depression Screening     High intensity statin          Signed By: Al Desir DO     April 22, 2021

## 2021-04-23 VITALS
HEART RATE: 60 BPM | TEMPERATURE: 97.9 F | OXYGEN SATURATION: 95 % | BODY MASS INDEX: 31.23 KG/M2 | SYSTOLIC BLOOD PRESSURE: 137 MMHG | WEIGHT: 199 LBS | RESPIRATION RATE: 17 BRPM | DIASTOLIC BLOOD PRESSURE: 82 MMHG | HEIGHT: 67 IN

## 2021-04-23 LAB
ANION GAP SERPL CALC-SCNC: 4 MMOL/L (ref 7–16)
BASOPHILS # BLD: 0 K/UL (ref 0–0.2)
BASOPHILS NFR BLD: 1 % (ref 0–2)
BUN SERPL-MCNC: 20 MG/DL (ref 8–23)
CALCIUM SERPL-MCNC: 8.5 MG/DL (ref 8.3–10.4)
CHLORIDE SERPL-SCNC: 108 MMOL/L (ref 98–107)
CHOLEST SERPL-MCNC: 142 MG/DL
CO2 SERPL-SCNC: 28 MMOL/L (ref 21–32)
CREAT SERPL-MCNC: 0.99 MG/DL (ref 0.8–1.5)
DIFFERENTIAL METHOD BLD: NORMAL
EOSINOPHIL # BLD: 0.1 K/UL (ref 0–0.8)
EOSINOPHIL NFR BLD: 2 % (ref 0.5–7.8)
ERYTHROCYTE [DISTWIDTH] IN BLOOD BY AUTOMATED COUNT: 13 % (ref 11.9–14.6)
EST. AVERAGE GLUCOSE BLD GHB EST-MCNC: 117 MG/DL
GLUCOSE SERPL-MCNC: 106 MG/DL (ref 65–100)
HBA1C MFR BLD: 5.7 % (ref 4.2–6.3)
HCT VFR BLD AUTO: 43.9 % (ref 41.1–50.3)
HDLC SERPL-MCNC: 36 MG/DL (ref 40–60)
HDLC SERPL: 3.9 {RATIO}
HGB BLD-MCNC: 14.9 G/DL (ref 13.6–17.2)
IMM GRANULOCYTES # BLD AUTO: 0 K/UL (ref 0–0.5)
IMM GRANULOCYTES NFR BLD AUTO: 0 % (ref 0–5)
LDLC SERPL CALC-MCNC: 65.6 MG/DL
LIPID PROFILE,FLP: ABNORMAL
LYMPHOCYTES # BLD: 1.5 K/UL (ref 0.5–4.6)
LYMPHOCYTES NFR BLD: 32 % (ref 13–44)
MCH RBC QN AUTO: 30 PG (ref 26.1–32.9)
MCHC RBC AUTO-ENTMCNC: 33.9 G/DL (ref 31.4–35)
MCV RBC AUTO: 88.3 FL (ref 79.6–97.8)
MONOCYTES # BLD: 0.6 K/UL (ref 0.1–1.3)
MONOCYTES NFR BLD: 12 % (ref 4–12)
NEUTS SEG # BLD: 2.4 K/UL (ref 1.7–8.2)
NEUTS SEG NFR BLD: 53 % (ref 43–78)
NRBC # BLD: 0 K/UL (ref 0–0.2)
PLATELET # BLD AUTO: 166 K/UL (ref 150–450)
PMV BLD AUTO: 9.6 FL (ref 9.4–12.3)
POTASSIUM SERPL-SCNC: 3.8 MMOL/L (ref 3.5–5.1)
RBC # BLD AUTO: 4.97 M/UL (ref 4.23–5.6)
SODIUM SERPL-SCNC: 140 MMOL/L (ref 138–145)
TRIGL SERPL-MCNC: 202 MG/DL (ref 35–150)
VLDLC SERPL CALC-MCNC: 40.4 MG/DL (ref 6–23)
WBC # BLD AUTO: 4.6 K/UL (ref 4.3–11.1)

## 2021-04-23 PROCEDURE — 36415 COLL VENOUS BLD VENIPUNCTURE: CPT

## 2021-04-23 PROCEDURE — 74011250637 HC RX REV CODE- 250/637: Performed by: INTERNAL MEDICINE

## 2021-04-23 PROCEDURE — 97530 THERAPEUTIC ACTIVITIES: CPT

## 2021-04-23 PROCEDURE — 85025 COMPLETE CBC W/AUTO DIFF WBC: CPT

## 2021-04-23 PROCEDURE — 80061 LIPID PANEL: CPT

## 2021-04-23 PROCEDURE — 74011250637 HC RX REV CODE- 250/637: Performed by: PSYCHIATRY & NEUROLOGY

## 2021-04-23 PROCEDURE — 97161 PT EVAL LOW COMPLEX 20 MIN: CPT

## 2021-04-23 PROCEDURE — 83036 HEMOGLOBIN GLYCOSYLATED A1C: CPT

## 2021-04-23 PROCEDURE — 92610 EVALUATE SWALLOWING FUNCTION: CPT

## 2021-04-23 PROCEDURE — 99231 SBSQ HOSP IP/OBS SF/LOW 25: CPT | Performed by: PHYSICAL MEDICINE & REHABILITATION

## 2021-04-23 PROCEDURE — 97165 OT EVAL LOW COMPLEX 30 MIN: CPT

## 2021-04-23 PROCEDURE — APPSS30 APP SPLIT SHARED TIME 16-30 MINUTES: Performed by: NURSE PRACTITIONER

## 2021-04-23 PROCEDURE — 99232 SBSQ HOSP IP/OBS MODERATE 35: CPT | Performed by: PSYCHIATRY & NEUROLOGY

## 2021-04-23 PROCEDURE — 80048 BASIC METABOLIC PNL TOTAL CA: CPT

## 2021-04-23 RX ORDER — CLOPIDOGREL BISULFATE 75 MG/1
75 TABLET ORAL DAILY
Qty: 30 TAB | Refills: 0 | Status: SHIPPED | OUTPATIENT
Start: 2021-04-23 | End: 2021-05-28 | Stop reason: ALTCHOICE

## 2021-04-23 RX ORDER — CLOPIDOGREL BISULFATE 75 MG/1
75 TABLET ORAL DAILY
Status: DISCONTINUED | OUTPATIENT
Start: 2021-04-23 | End: 2021-04-23 | Stop reason: HOSPADM

## 2021-04-23 RX ORDER — AMLODIPINE BESYLATE 5 MG/1
5 TABLET ORAL DAILY
Qty: 30 TAB | Refills: 0 | Status: SHIPPED | OUTPATIENT
Start: 2021-04-23

## 2021-04-23 RX ADMIN — ASPIRIN 81 MG: 81 TABLET, CHEWABLE ORAL at 09:08

## 2021-04-23 RX ADMIN — CLOPIDOGREL BISULFATE 75 MG: 75 TABLET ORAL at 09:08

## 2021-04-23 RX ADMIN — LORATADINE 10 MG: 10 TABLET ORAL at 09:08

## 2021-04-23 RX ADMIN — ACETAMINOPHEN 1300 MG: 325 TABLET, FILM COATED ORAL at 09:08

## 2021-04-23 RX ADMIN — Medication 10 ML: at 06:31

## 2021-04-23 NOTE — CONSULTS
Michelle Ojeda MD  Medical Director  [address] [place] [address], [address] [facility]  Tel: [phone]       Physical Medicine & Rehabilitation Consult    Subjective:     Date of Consultation:  April 23, 2021  Referring Physician: Dr Asad Ervin is a 66 y.o. male who is being evaluated at the request of the Hospitalist service for consideration for inpatient rehabilitation following a cerebellar infarct. HPI: The patient is a right hand dominant 66yo male with a PMH of HTN, HLD, DDD and arthritis who presented to the ED 4/22 with a 3d hx of dizziness beginning when he wakes up. Patient reports that his dizziness first 2 days lasted only a few minutes of the time he walked out of his bedroom and had resolved. He cannot characterize dizziness as spinning but seems more a lightheaded sensation. yesterday morning he reports this issue was much worse in the previous 2 days. Reports his dizziness lasted for a few hours. Says he was having trouble coordinating his legs while trying to walk. Denied any changes in vision, headache, fever, chills, difficulty with speech or swallow. No changes in medications recently. Reports a similar episode more than a decade ago and was diagnosed as having a TIA. Says he still takes his aspirin but has not been taking his Plavix. Head CT showed Chronic appearing white matter change without acute intracranial change. MRI ; Subtle diffusion restricted suggested in the midbrain. An acute or subacute  lacunar infarction is not excluded. CTA; No evidence of large vessel occlusive disease or high-grade stenosis. Dominant right thyroid nodule. Recommend ultrasound further evaluate and characterize. Initial   OT reports supervision with bathing, set up dressing, mod I toileting. OT felt pt was functioning close to baseline . PT pending.  No swallowing deficits.      Active Problems:    Essential hypertension (10/3/2009)      Hyperlipemia (10/3/2009)      Stroke ([facility] (2021)      Past Medical History:   Diagnosis Date    Hypertension     Other Ill-Defined Conditions     high cholesterol      Past Surgical History:   Procedure Laterality Date    ABDOMEN SURGERY PROC UNLISTED      henia repair    HX ORTHOPAEDIC      right knee, back    HX PROSTATECTOMY        No family history on file. Social History     Tobacco Use    Smoking status: Never Smoker   Substance Use Topics    Alcohol use: No     Prior to Admission medications    Medication Sig Start Date End Date Taking? Authorizing Provider   diclofenac (VOLTAREN) 1 % gel Apply 1 g to affected area four (4) times daily. Yes Provider, Historical   acetaminophen (TYLENOL) 325 mg tablet Take 1,300 mg by mouth three (3) times daily. Yes Provider, Historical   amLODIPine (Norvasc) 2.5 mg tablet Take 2.5 mg by mouth daily. Yes Provider, Historical   aspirin 81 mg chewable tablet Take 1 Tab by mouth daily. 10/4/09  Yes James Luther NP   SIMVASTATIN PO Take 40 mg by mouth. Yes Other, MD Marla   Hydrochlorothiazide 12.5 mg tablet Take 12.5 mg by mouth daily. Yes Other, MD Marla   valsartan (DIOVAN) 160 mg tablet Take  by mouth daily. Take one half tab by mouth every day    Yes Provider, Historical   cetirizine (ZYRTEC) 10 mg tablet Take 10 mg by mouth daily. Take one table daily    Yes Provider, Historical   clopidogrel (PLAVIX) 75 mg tablet Take 1 Tab by mouth daily. 10/4/09   James Luther NP     No Known Allergies     Review of Systems:  A comprehensive review of systems was negative except for that written in the HPI. Objective:     Vitals:  Blood pressure 139/89, pulse 64, temperature 98.1 °F (36.7 °C), resp. rate 18, height 5' 7\" (1.702 m), weight 199 lb (90.3 kg), SpO2 96 %. Temp (24hrs), Av.9 °F (36.6 °C), Min:97.3 °F (36.3 °C), Max:98.5 °F (36.9 °C)      Intake and Output:  No intake/output data recorded.     Physical Exam:  General:  Alert, oriented and mood affect appropriate   Lungs:   Clear to auscultation bilaterally. Heart:  Regular rate and rhythm, S1, S2 stable, no murmur, click, rub or gallop. Abdomen:   Soft, non-tender. Bowel sounds present. No masses,  No organomegaly. Genitourinary: defer   Neuro Muscular: PERRLA, EOMI, no nystagmus  Reflexes normoative  Strength 5/5 throughout  Plantar response is flexor  No pathological reflexes  No dysmetria or drift   Skin:  No rashes, lesions, or signs/symptoms or infection. Labs/Studies:  Recent Results (from the past 72 hour(s))   GLUCOSE, POC    Collection Time: 04/22/21 11:47 AM   Result Value Ref Range    Glucose (POC) 74 65 - 100 mg/dL    Performed by Tutu    CBC WITH AUTOMATED DIFF    Collection Time: 04/22/21 11:49 AM   Result Value Ref Range    WBC 4.8 4.3 - 11.1 K/uL    RBC 5.13 4.23 - 5.6 M/uL    HGB 15.4 13.6 - 17.2 g/dL    HCT 45.4 41.1 - 50.3 %    MCV 88.5 79.6 - 97.8 FL    MCH 30.0 26.1 - 32.9 PG    MCHC 33.9 31.4 - 35.0 g/dL    RDW 12.8 11.9 - 14.6 %    PLATELET 669 539 - 782 K/uL    MPV 9.6 9.4 - 12.3 FL    ABSOLUTE NRBC 0.00 0.0 - 0.2 K/uL    DF AUTOMATED      NEUTROPHILS 45 43 - 78 %    LYMPHOCYTES 40 13 - 44 %    MONOCYTES 13 (H) 4.0 - 12.0 %    EOSINOPHILS 2 0.5 - 7.8 %    BASOPHILS 0 0.0 - 2.0 %    IMMATURE GRANULOCYTES 0 0.0 - 5.0 %    ABS. NEUTROPHILS 2.1 1.7 - 8.2 K/UL    ABS. LYMPHOCYTES 1.9 0.5 - 4.6 K/UL    ABS. MONOCYTES 0.6 0.1 - 1.3 K/UL    ABS. EOSINOPHILS 0.1 0.0 - 0.8 K/UL    ABS. BASOPHILS 0.0 0.0 - 0.2 K/UL    ABS. IMM.  GRANS. 0.0 0.0 - 0.5 K/UL   METABOLIC PANEL, BASIC    Collection Time: 04/22/21 11:49 AM   Result Value Ref Range    Sodium 142 138 - 145 mmol/L    Potassium 4.1 3.5 - 5.1 mmol/L    Chloride 109 (H) 98 - 107 mmol/L    CO2 29 21 - 32 mmol/L    Anion gap 4 (L) 7 - 16 mmol/L    Glucose 84 65 - 100 mg/dL    BUN 26 (H) 8 - 23 MG/DL    Creatinine 0.96 0.8 - 1.5 MG/DL    GFR est AA >60 >60 ml/min/1.73m2    GFR est non-AA >60 >60 ml/min/1.73m2    Calcium 9.4 8.3 - 10.4 MG/DL   PROTHROMBIN TIME + INR    Collection Time: 04/22/21 11:49 AM   Result Value Ref Range    Prothrombin time 13.4 12.5 - 14.7 sec    INR 1.0     TROPONIN-HIGH SENSITIVITY    Collection Time: 04/22/21 11:49 AM   Result Value Ref Range    Troponin-High Sensitivity 5.5 0 - 14 pg/mL   POC PT/INR    Collection Time: 04/22/21 11:50 AM   Result Value Ref Range    Prothrombin time (POC) 12.4 (H) 9.6 - 11.6 SECS    INR (POC) 1.0 0.9 - 1.2     EKG, 12 LEAD, INITIAL    Collection Time: 04/22/21 12:24 PM   Result Value Ref Range    Ventricular Rate 57 BPM    Atrial Rate 57 BPM    P-R Interval 206 ms    QRS Duration 92 ms    Q-T Interval 410 ms    QTC Calculation (Bezet) 399 ms    Calculated P Axis 73 degrees    Calculated R Axis -11 degrees    Calculated T Axis 56 degrees    Diagnosis       !! AGE AND GENDER SPECIFIC ECG ANALYSIS !!   Sinus bradycardia  Otherwise normal ECG  When compared with ECG of 03-OCT-2009 08:57,  No significant change was found  Confirmed by Swapnil Medellin MD (), RHEA GUILLORY ([id number]) on 4/22/2021 3:47:29 PM     TSH 3RD GENERATION    Collection Time: 04/22/21  4:49 PM   Result Value Ref Range    TSH 0.948 0.358 - 3.740 uIU/mL   T4, FREE    Collection Time: 04/22/21  4:49 PM   Result Value Ref Range    T4, Free 0.7 (L) 0.78 - 1.46 NG/DL   LIPID PANEL    Collection Time: 04/23/21  5:32 AM   Result Value Ref Range    LIPID PROFILE          Cholesterol, total 142 <200 MG/DL    Triglyceride 202 (H) 35 - 150 MG/DL    HDL Cholesterol 36 (L) 40 - 60 MG/DL    LDL, calculated 65.6 <100 MG/DL    VLDL, calculated 40.4 (H) 6.0 - 23.0 MG/DL    CHOL/HDL Ratio 3.9     HEMOGLOBIN A1C WITH EAG    Collection Time: 04/23/21  5:32 AM   Result Value Ref Range    Hemoglobin A1c 5.7 4.20 - 6.30 %    Est. average glucose 026 mg/dL   METABOLIC PANEL, BASIC    Collection Time: 04/23/21  5:32 AM   Result Value Ref Range    Sodium 140 138 - 145 mmol/L    Potassium 3.8 3.5 - 5.1 mmol/L Chloride 108 (H) 98 - 107 mmol/L    CO2 28 21 - 32 mmol/L    Anion gap 4 (L) 7 - 16 mmol/L    Glucose 106 (H) 65 - 100 mg/dL    BUN 20 8 - 23 MG/DL    Creatinine 0.99 0.8 - 1.5 MG/DL    GFR est AA >60 >60 ml/min/1.73m2    GFR est non-AA >60 >60 ml/min/1.73m2    Calcium 8.5 8.3 - 10.4 MG/DL   CBC WITH AUTOMATED DIFF    Collection Time: 04/23/21  5:32 AM   Result Value Ref Range    WBC 4.6 4.3 - 11.1 K/uL    RBC 4.97 4.23 - 5.6 M/uL    HGB 14.9 13.6 - 17.2 g/dL    HCT 43.9 41.1 - 50.3 %    MCV 88.3 79.6 - 97.8 FL    MCH 30.0 26.1 - 32.9 PG    MCHC 33.9 31.4 - 35.0 g/dL    RDW 13.0 11.9 - 14.6 %    PLATELET 345 463 - 708 K/uL    MPV 9.6 9.4 - 12.3 FL    ABSOLUTE NRBC 0.00 0.0 - 0.2 K/uL    DF AUTOMATED      NEUTROPHILS 53 43 - 78 %    LYMPHOCYTES 32 13 - 44 %    MONOCYTES 12 4.0 - 12.0 %    EOSINOPHILS 2 0.5 - 7.8 %    BASOPHILS 1 0.0 - 2.0 %    IMMATURE GRANULOCYTES 0 0.0 - 5.0 %    ABS. NEUTROPHILS 2.4 1.7 - 8.2 K/UL    ABS. LYMPHOCYTES 1.5 0.5 - 4.6 K/UL    ABS. MONOCYTES 0.6 0.1 - 1.3 K/UL    ABS. EOSINOPHILS 0.1 0.0 - 0.8 K/UL    ABS. BASOPHILS 0.0 0.0 - 0.2 K/UL    ABS. IMM.  GRANS. 0.0 0.0 - 0.5 K/UL         Functional Assessment:  Functional Assessment  Baseline Functional Status: Ambulated independently  Fall in Past 12 Months: No  Decline in Gait/Transfer/Balance: No  Decline in Capacity to Feed/Dress/Bathe: No  Developmental Delay: No  Chewing/Swallowing Problems: No  Difficulty with Secretions: No  Speech Slurred/Thick/Garbled: No     Cruz Score:        Speech Assessment:  Dysphagia Screening  Vocal Quality/Secretions: Normal  History of Dysphagia: No  O2 Saturation: Normal  Alertness: Normal  Pre-Swallow Assessment Score: 0  Purees: No difficulty noted  Water by Cup: No difficulty noted  Water by Straw: No difficulty noted                Ambulation:  Activity and Safety  Activity Level: Bath Room Privileges  Ambulate: Ambulate to bathroom  Activity: Family/Visitors present  Activity Assistance: Partial (one person)  Weight Bearing Status: WBAT (Weight Bearing as Tolerated)  Mode of Transportation: Stretcher  Repositioned: Head of bed elevated (degrees)  Patient Turned: Turns self  Head of Bed Elevated: Self regulated  Activity Response: Tolerated well  Assistive Device: Fall prevention device  Safety Measures: Bed/Chair alarm on, Bed/Chair-Wheels locked, Bed in low position, Call light within reach, Fall prevention (comment), Family at bedside, Gripper socks, Side rails X2     Impression / Assessment:     Active Problems:    Essential hypertension (10/3/2009)      Hyperlipemia (10/3/2009)      [facility] (4/22/2021)         Plan / Recommendations / Medical Decision Making:     Recommendations:   Continue Acute Rehab Program  Coordination of rehab / medical care  Counseling of PM&R care issues management  Monitoring and management of medical conditions per plan of care / orders  -pt without deficits that would warrant further need for inpt rehab.  -await PT eval to detm if pt has higher level balance deficits  -cont ASA, Plavix, statin  -2D ECHO pending  Discussion with Family / Caregiver / Staff    Reviewed Therapies / Argelia Viveros / Mecca Hopper / Records      Thank you very much for this referral. We appreciate the opportunity to participate in this patient's care. We will continue to follow. Yen Hernandez MD, Medical Director  [address] [place] [address]

## 2021-04-23 NOTE — PROGRESS NOTES
Problem: Patient Education: Go to Patient Education Activity  Goal: Patient/Family Education  Outcome: Progressing Towards Goal     Problem: Patient Education: Go to Patient Education Activity  Goal: Patient/Family Education  Outcome: Progressing Towards Goal     Problem: TIA/CVA Stroke: 0-24 hours  Goal: Off Pathway (Use only if patient is Off Pathway)  Outcome: Progressing Towards Goal  Goal: Activity/Safety  Outcome: Progressing Towards Goal  Goal: Consults, if ordered  Outcome: Progressing Towards Goal  Goal: Diagnostic Test/Procedures  Outcome: Progressing Towards Goal  Goal: Nutrition/Diet  Outcome: Progressing Towards Goal  Goal: Discharge Planning  Outcome: Progressing Towards Goal  Goal: Medications  Outcome: Progressing Towards Goal  Goal: Respiratory  Outcome: Progressing Towards Goal  Goal: Treatments/Interventions/Procedures  Outcome: Progressing Towards Goal  Goal: Minimize risk of bleeding post-thrombolytic infusion  Outcome: Progressing Towards Goal  Goal: Monitor for complications post-thrombolytic infusion  Outcome: Progressing Towards Goal  Goal: Psychosocial  Outcome: Progressing Towards Goal  Goal: *Hemodynamically stable  Outcome: Progressing Towards Goal  Goal: *Neurologically stable  Description: Absence of additional neurological deficits    Outcome: Progressing Towards Goal  Goal: *Verbalizes anxiety and depression are reduced or absent  Outcome: Progressing Towards Goal  Goal: *Absence of Signs of Aspiration on Current Diet  Outcome: Progressing Towards Goal  Goal: *Absence of deep venous thrombosis signs and symptoms(Stroke Metric)  Outcome: Progressing Towards Goal  Goal: *Ability to perform ADLs and demonstrates progressive mobility and function  Outcome: Progressing Towards Goal  Goal: *Stroke education started(Stroke Metric)  Outcome: Progressing Towards Goal  Goal: *Dysphagia screen performed(Stroke Metric)  Outcome: Progressing Towards Goal  Goal: *Rehab consulted(Stroke Metric)  Outcome: Progressing Towards Goal     Problem: TIA/CVA Stroke: Day 2 Until Discharge  Goal: Off Pathway (Use only if patient is Off Pathway)  Outcome: Progressing Towards Goal  Goal: Activity/Safety  Outcome: Progressing Towards Goal  Goal: Diagnostic Test/Procedures  Outcome: Progressing Towards Goal  Goal: Nutrition/Diet  Outcome: Progressing Towards Goal  Goal: Discharge Planning  Outcome: Progressing Towards Goal  Goal: Medications  Outcome: Progressing Towards Goal  Goal: Respiratory  Outcome: Progressing Towards Goal  Goal: Treatments/Interventions/Procedures  Outcome: Progressing Towards Goal  Goal: Psychosocial  Outcome: Progressing Towards Goal  Goal: *Verbalizes anxiety and depression are reduced or absent  Outcome: Progressing Towards Goal  Goal: *Absence of aspiration  Outcome: Progressing Towards Goal  Goal: *Absence of deep venous thrombosis signs and symptoms(Stroke Metric)  Outcome: Progressing Towards Goal  Goal: *Optimal pain control at patient's stated goal  Outcome: Progressing Towards Goal  Goal: *Tolerating diet  Outcome: Progressing Towards Goal  Goal: *Ability to perform ADLs and demonstrates progressive mobility and function  Outcome: Progressing Towards Goal  Goal: *Stroke education continued(Stroke Metric)  Outcome: Progressing Towards Goal     Problem: Ischemic Stroke: Discharge Outcomes  Goal: *Verbalizes anxiety and depression are reduced or absent  Outcome: Progressing Towards Goal  Goal: *Verbalize understanding of risk factor modification(Stroke Metric)  Outcome: Progressing Towards Goal  Goal: *Hemodynamically stable  Outcome: Progressing Towards Goal  Goal: *Absence of aspiration pneumonia  Outcome: Progressing Towards Goal  Goal: *Aware of needed dietary changes  Outcome: Progressing Towards Goal  Goal: *Verbalize understanding of prescribed medications including anti-coagulants, anti-lipid, and/or anti-platelets(Stroke Metric)  Outcome: Progressing Towards Goal  Goal: *Tolerating diet  Outcome: Progressing Towards Goal  Goal: *Aware of follow-up diagnostics related to anticoagulants  Outcome: Progressing Towards Goal  Goal: *Ability to perform ADLs and demonstrates progressive mobility and function  Outcome: Progressing Towards Goal  Goal: *Absence of DVT(Stroke Metric)  Outcome: Progressing Towards Goal  Goal: *Absence of aspiration  Outcome: Progressing Towards Goal  Goal: *Optimal pain control at patient's stated goal  Outcome: Progressing Towards Goal  Goal: *Home safety concerns addressed  Outcome: Progressing Towards Goal  Goal: *Describes available resources and support systems  Outcome: Progressing Towards Goal  Goal: *Verbalizes understanding of activation of EMS(911) for stroke symptoms(Stroke Metric)  Outcome: Progressing Towards Goal  Goal: *Understands and describes signs and symptoms to report to providers(Stroke Metric)  Outcome: Progressing Towards Goal  Goal: *Neurolgocially stable (absence of additional neurological deficits)  Outcome: Progressing Towards Goal  Goal: *Verbalizes importance of follow-up with primary care physician(Stroke Metric)  Outcome: Progressing Towards Goal  Goal: *Smoking cessation discussed,if applicable(Stroke Metric)  Outcome: Progressing Towards Goal  Goal: *Depression screening completed(Stroke Metric)  Outcome: Progressing Towards Goal

## 2021-04-23 NOTE — PROGRESS NOTES
CM in to see patient for CM consult related to discharge planning. Patient is medically ready for discharge and therapy has stated that patient has no needs at discharge. Patient is alert and sitting in recliner and wife is present. Demogrpahics, PCP and insurance verified. Patient is current with the [facility], but does not know name of provider. Patient does not have any concerns related to discharge or home environment. Demographics updated in CC. Wife will transport home. Care Management Interventions  [facility] by CM: [facility]'t [facility], but [facility])  Mode of Transport at Discharge:  Other (see comment)(Spouse)  Transition of Care Consult (CM Consult): Discharge Planning  Discharge Durable Medical Equipment: No  Physical Therapy Consult: Yes  Occupational Therapy Consult: Yes  Speech Therapy Consult: Yes  Current Support Network: Own Home, Lives with Spouse  Confirm Follow Up Transport: Family  Discharge Location  Discharge Placement: Home

## 2021-04-23 NOTE — DISCHARGE INSTRUCTIONS
Patient Education        Stroke: Care Instructions  Your Care Instructions     You have had a stroke. This means that the blood flow to a part of your brain was blocked for some time, which damages the nerve cells in that part of the brain. The part of your body controlled by that part of your brain may not function properly now. The brain is an amazing organ that can heal itself to some degree. The stroke you had damaged part of your brain. But other parts of your brain may take over in some way for the damaged areas. You have already started this process. Your doctor will talk with you about what you can do to prevent another stroke. High blood pressure, high cholesterol, and diabetes are all risk factors for stroke. If you have any of these conditions, work with your doctor to make sure they are under control. Other risk factors for stroke include being overweight, smoking, and not getting regular exercise. Going home may be hard for you and your family. The more you can try to do for yourself, the better. Remember to take each day one at a time. Follow-up care is a key part of your treatment and safety. Be sure to make and go to all appointments, and call your doctor if you are having problems. It's also a good idea to know your test results and keep a list of the medicines you take. How can you care for yourself at home?    · Enter a stroke rehabilitation (rehab) program, if your doctor recommends it. Physical, speech, and occupational therapies can help you manage bathing, dressing, eating, and other basics of daily living.     · Do not drive until your doctor says it is okay.     · It is normal to feel sad or depressed after a stroke. If these feelings last, talk to your doctor.     · If you are having problems with urine leakage, go to the bathroom at regular times, including when you first wake up and at bedtime. Also, limit fluids after dinner.     · If you are constipated, drink plenty of fluids.  If you have kidney, heart, or liver disease and have to limit fluids, talk with your doctor before you increase the amount of fluids you drink. Set up a regular time for using the toilet. If you continue to have constipation, your doctor may suggest using a bulking agent, such as Metamucil, or a stool softener, laxative, or enema. Medicines    · Take your medicines exactly as prescribed. Call your doctor if you think you are having a problem with your medicine. You may be taking several medicines. ACE (angiotensin-converting enzyme) inhibitors, angiotensin II receptor blockers (ARBs), beta-blockers, diuretics (water pills), and calcium channel blockers control your blood pressure. Statins help lower cholesterol. Your doctor may also prescribe medicines for depression, pain, sleep problems, anxiety, or agitation.     · If your doctor has given you a blood thinner to prevent another stroke, be sure you get instructions about how to take your medicine safely. Blood thinners can cause serious bleeding problems.     · Do not take any over-the-counter medicines or herbal products without talking to your doctor first.     · If you take birth control pills or hormone therapy, talk to your doctor about whether they are right for you. For family members and caregivers    · Make the home safe. Set up a room so that your loved one does not have to climb stairs. Be sure the bathroom is on the same floor. Move throw rugs and furniture that could cause falls. Make sure that the lighting is good. Put grab bars and seats in tubs and showers.     · Find out what your loved one can do and what they need help with. Try not to do things for your loved one that your loved one can do on their own. Help them learn and practice new skills.     · Visit and talk with your loved one often. Try doing activities together that you both enjoy, such as playing cards or board games. Keep in touch with your loved one's friends as much as you can. Encourage them to visit.     · Take care of yourself. Do not try to do everything yourself. Ask other family members to help. Eat well, get enough rest, and take time to do things that you enjoy. Keep up with your own doctor visits, and make sure to take your medicines regularly. Get out of the house as much as you can. Join a local support group. Find out if you qualify for home health care visits to help with rehab or for adult day care. When should you call for help? Call 911 anytime you think you may need emergency care. For example, call if:    · You have signs of another stroke. These may include:  ? Sudden numbness, tingling, weakness, or loss of movement in your face, arm, or leg, especially on only one side of your body. ? Sudden vision changes. ? Sudden trouble speaking. ? Sudden confusion or trouble understanding simple statements. ? Sudden problems with walking or balance. ? A sudden, severe headache that is different from past headaches. Call 911 even if these symptoms go away in a few minutes. Call your doctor now or seek immediate medical care if:    · You have new symptoms that may be related to your stroke, such as falls or trouble swallowing. Watch closely for changes in your health, and be sure to contact your doctor if you have any problems. Where can you learn more? Go to http://www.gray.com/  Enter C294 in the search box to learn more about \"Stroke: Care Instructions. \"  Current as of: March 4, 2020               Content Version: 12.8  © 2006-2021 [employer]. Care instructions adapted under license by [employer] (which disclaims liability or warranty for this information). If you have questions about a medical condition or this instruction, always ask your healthcare professional. [address] any warranty or liability for your use of this information.

## 2021-04-23 NOTE — PROGRESS NOTES
Problem: Falls - Risk of  Goal: *Absence of Falls  Description: Document Tara Owen Fall Risk and appropriate interventions in the flowsheet.   4/23/2021 1401 by Nader Martin RN  Outcome: Resolved/Met  Note: Fall Risk Interventions:  Mobility Interventions: Bed/chair exit alarm, Communicate number of staff needed for ambulation/transfer, Patient to call before getting OOB              Elimination Interventions: Bed/chair exit alarm, Call light in reach, Patient to call for help with toileting needs, Stay With Me (per policy), Toilet paper/wipes in reach, Toileting schedule/hourly rounds, Urinal in reach           4/23/2021 0917 by Nader Martin RN  Outcome: Progressing Towards Goal  Note: Fall Risk Interventions:  Mobility Interventions: Bed/chair exit alarm, Communicate number of staff needed for ambulation/transfer, Patient to call before getting OOB              Elimination Interventions: Bed/chair exit alarm, Call light in reach, Patient to call for help with toileting needs, Stay With Me (per policy), Toilet paper/wipes in reach, Toileting schedule/hourly rounds, Urinal in reach              Problem: Patient Education: Go to Patient Education Activity  Goal: Patient/Family Education  4/23/2021 1401 by Nader Martin RN  Outcome: Resolved/Met  4/23/2021 0917 by Nader Martin RN  Outcome: Progressing Towards Goal     Problem: Patient Education: Go to Patient Education Activity  Goal: Patient/Family Education  4/23/2021 1401 by Nader Martin RN  Outcome: Resolved/Met  4/23/2021 0917 by Nader Martin RN  Outcome: Progressing Towards Goal     Problem: TIA/CVA Stroke: 0-24 hours  Goal: Off Pathway (Use only if patient is Off Pathway)  Outcome: Resolved/Met  Goal: Activity/Safety  4/23/2021 1401 by Nader Martin RN  Outcome: Resolved/Met  4/23/2021 0917 by Nader Martin RN  Outcome: Progressing Towards Goal  Goal: Consults, if ordered  4/23/2021 1401 by Nader Martin RN  Outcome: Resolved/Met  4/23/2021 0917 by Dimas Suggs, RN  Outcome: Progressing Towards Goal  Goal: Diagnostic Test/Procedures  4/23/2021 1401 by Dimas Suggs, RN  Outcome: Resolved/Met  4/23/2021 0917 by Dimas Suggs RN  Outcome: Progressing Towards Goal  Goal: Nutrition/Diet  4/23/2021 1401 by Dimas Suggs, RN  Outcome: Resolved/Met  4/23/2021 0917 by Dimas Suggs, RN  Outcome: Progressing Towards Goal  Goal: Discharge Planning  4/23/2021 1401 by Dimas Suggs RN  Outcome: Resolved/Met  4/23/2021 0917 by Dimas Suggs RN  Outcome: Progressing Towards Goal  Goal: Medications  4/23/2021 1401 by Dimas Suggs, RN  Outcome: Resolved/Met  4/23/2021 0917 by Dimas Suggs RN  Outcome: Progressing Towards Goal  Goal: Respiratory  4/23/2021 1401 by Dimas Suggs, RN  Outcome: Resolved/Met  4/23/2021 0917 by Dimas Suggs, RN  Outcome: Progressing Towards Goal  Goal: Treatments/Interventions/Procedures  4/23/2021 1401 by Dimas Suggs, RN  Outcome: Resolved/Met  4/23/2021 0917 by Dimas Suggs RN  Outcome: Progressing Towards Goal  Goal: Minimize risk of bleeding post-thrombolytic infusion  4/23/2021 1401 by Dimas Suggs, RN  Outcome: Resolved/Met  4/23/2021 0917 by Dimas Suggs RN  Outcome: Progressing Towards Goal  Goal: Monitor for complications post-thrombolytic infusion  4/23/2021 1401 by Dimas Suggs, RN  Outcome: Resolved/Met  4/23/2021 0917 by Dimas Suggs RN  Outcome: Progressing Towards Goal  Goal: Psychosocial  4/23/2021 1401 by Dimas Suggs, RN  Outcome: Resolved/Met  4/23/2021 0917 by Dimas Suggs RN  Outcome: Progressing Towards Goal  Goal: *Hemodynamically stable  4/23/2021 1401 by Dimas Suggs, RN  Outcome: Resolved/Met  4/23/2021 0917 by Dimas Suggs, RN  Outcome: Progressing Towards Goal  Goal: *Neurologically stable  Description: Absence of additional neurological deficits    4/23/2021 1401 by Dimas Suggs, RN  Outcome: Resolved/Met  4/23/2021 0917 by Dimas Suggs RN  Outcome: Progressing Towards Goal  Goal: Deejay Alonso anxiety and depression are reduced or absent  4/23/2021 1401 by Veda Tian RN  Outcome: Resolved/Met  4/23/2021 0917 by Veda Tian RN  Outcome: Progressing Towards Goal  Goal: *Absence of Signs of Aspiration on Current Diet  4/23/2021 1401 by Veda Tian RN  Outcome: Resolved/Met  4/23/2021 0917 by Veda Tian RN  Outcome: Progressing Towards Goal  Goal: *Absence of deep venous thrombosis signs and symptoms(Stroke Metric)  4/23/2021 1401 by Veda Tian RN  Outcome: Resolved/Met  4/23/2021 0917 by Veda Tian RN  Outcome: Progressing Towards Goal  Goal: *Ability to perform ADLs and demonstrates progressive mobility and function  4/23/2021 1401 by Veda Tian RN  Outcome: Resolved/Met  4/23/2021 0917 by Veda Tian RN  Outcome: Progressing Towards Goal  Goal: *Stroke education started(Stroke Metric)  4/23/2021 1401 by Veda Tian RN  Outcome: Resolved/Met  4/23/2021 0917 by Veda Tian RN  Outcome: Progressing Towards Goal  Goal: *Dysphagia screen performed(Stroke Metric)  4/23/2021 1401 by Veda Tian RN  Outcome: Resolved/Met  4/23/2021 0917 by Veda Tian RN  Outcome: Progressing Towards Goal  Goal: *Rehab consulted(Stroke Metric)  4/23/2021 1401 by Veda Tian RN  Outcome: Resolved/Met  4/23/2021 0917 by Veda Tian RN  Outcome: Progressing Towards Goal     Problem: TIA/CVA Stroke: Day 2 Until Discharge  Goal: Off Pathway (Use only if patient is Off Pathway)  Outcome: Resolved/Met  Goal: Activity/Safety  4/23/2021 1401 by Veda Tian RN  Outcome: Resolved/Met  4/23/2021 0917 by Veda Tian RN  Outcome: Progressing Towards Goal  Goal: Diagnostic Test/Procedures  4/23/2021 1401 by Veda Tian RN  Outcome: Resolved/Met  4/23/2021 0917 by Veda Tian RN  Outcome: Progressing Towards Goal  Goal: Nutrition/Diet  4/23/2021 1401 by Veda Tian, RN  Outcome: Resolved/Met  4/23/2021 0917 by Veda Tian, RN  Outcome: Progressing Towards Goal  Goal: Discharge Planning  4/23/2021 1401 by Grace Mayo RN  Outcome: Resolved/Met  4/23/2021 0917 by Grace Mayo RN  Outcome: Progressing Towards Goal  Goal: Medications  4/23/2021 1401 by Grace Mayo RN  Outcome: Resolved/Met  4/23/2021 0917 by Grace Mayo RN  Outcome: Progressing Towards Goal  Goal: Respiratory  4/23/2021 1401 by Grace Mayo, RN  Outcome: Resolved/Met  4/23/2021 0917 by Grace Mayo RN  Outcome: Progressing Towards Goal  Goal: Treatments/Interventions/Procedures  4/23/2021 1401 by Grace Mayo RN  Outcome: Resolved/Met  4/23/2021 0917 by Grace Mayo RN  Outcome: Progressing Towards Goal  Goal: Psychosocial  4/23/2021 1401 by Grace Mayo RN  Outcome: Resolved/Met  4/23/2021 0917 by Grace Mayo RN  Outcome: Progressing Towards Goal  Goal: *Verbalizes anxiety and depression are reduced or absent  4/23/2021 1401 by Grace Mayo RN  Outcome: Resolved/Met  4/23/2021 0917 by Grace Mayo RN  Outcome: Progressing Towards Goal  Goal: *Absence of aspiration  4/23/2021 1401 by Grace Myao RN  Outcome: Resolved/Met  4/23/2021 0917 by Grace Mayo RN  Outcome: Progressing Towards Goal  Goal: *Absence of deep venous thrombosis signs and symptoms(Stroke Metric)  4/23/2021 1401 by Grace Mayo, RN  Outcome: Resolved/Met  4/23/2021 0917 by Grace Mayo RN  Outcome: Progressing Towards Goal  Goal: *Optimal pain control at patient's stated goal  4/23/2021 1401 by Grace Mayo, RN  Outcome: Resolved/Met  4/23/2021 0917 by Grace Mayo RN  Outcome: Progressing Towards Goal  Goal: *Tolerating diet  4/23/2021 1401 by Grace Mayo RN  Outcome: Resolved/Met  4/23/2021 0917 by Grace Mayo RN  Outcome: Progressing Towards Goal  Goal: *Ability to perform ADLs and demonstrates progressive mobility and function  4/23/2021 1401 by Grace Mayo, RN  Outcome: Resolved/Met  4/23/2021 0917 by Grace Mayo RN  Outcome: Progressing Towards Goal  Goal: *Stroke education continued(Stroke Metric)  4/23/2021 1401 by Dunia Parisi RN  Outcome: Resolved/Met  4/23/2021 0917 by Dunia Parisi RN  Outcome: Progressing Towards Goal     Problem: Ischemic Stroke: Discharge Outcomes  Goal: *Verbalizes anxiety and depression are reduced or absent  4/23/2021 1401 by Dunia Parisi, RN  Outcome: Resolved/Met  4/23/2021 0917 by Dunia Parisi RN  Outcome: Progressing Towards Goal  Goal: *Verbalize understanding of risk factor modification(Stroke Metric)  4/23/2021 1401 by Dunia Parisi, RN  Outcome: Resolved/Met  4/23/2021 0917 by Dunia Parisi RN  Outcome: Progressing Towards Goal  Goal: *Hemodynamically stable  4/23/2021 1401 by Dunia Parisi, RN  Outcome: Resolved/Met  4/23/2021 0917 by Dunia Parisi RN  Outcome: Progressing Towards Goal  Goal: *Absence of aspiration pneumonia  4/23/2021 1401 by Dunia Parisi, RN  Outcome: Resolved/Met  4/23/2021 0917 by Dunia Parisi RN  Outcome: Progressing Towards Goal  Goal: *Aware of needed dietary changes  4/23/2021 1401 by Dunia Parisi RN  Outcome: Resolved/Met  4/23/2021 0917 by Dunia Parisi RN  Outcome: Progressing Towards Goal  Goal: *Verbalize understanding of prescribed medications including anti-coagulants, anti-lipid, and/or anti-platelets(Stroke Metric)  4/23/2021 1401 by Dunia Parisi, RN  Outcome: Resolved/Met  4/23/2021 0917 by Dunia Parisi RN  Outcome: Progressing Towards Goal  Goal: *Tolerating diet  4/23/2021 1401 by Dunia Parisi, RN  Outcome: Resolved/Met  4/23/2021 0917 by Dunia Parisi RN  Outcome: Progressing Towards Goal  Goal: *Aware of follow-up diagnostics related to anticoagulants  4/23/2021 1401 by Dunia Parisi, RN  Outcome: Resolved/Met  4/23/2021 0917 by Dunia Parisi RN  Outcome: Progressing Towards Goal  Goal: *Ability to perform ADLs and demonstrates progressive mobility and function  4/23/2021 1401 by Dunia Isak, RN  Outcome: Resolved/Met  4/23/2021 0917 by Dunia Parisi, RN  Outcome: Progressing Towards Goal  Goal: *Absence of DVT(Stroke Metric)  4/23/2021 1401 by Darshana Rm RN  Outcome: Resolved/Met  4/23/2021 0917 by Darshana Rm RN  Outcome: Progressing Towards Goal  Goal: *Absence of aspiration  4/23/2021 1401 by Darshana Rm RN  Outcome: Resolved/Met  4/23/2021 0917 by Darshana Rm RN  Outcome: Progressing Towards Goal  Goal: *Optimal pain control at patient's stated goal  4/23/2021 1401 by Darshana Rm RN  Outcome: Resolved/Met  4/23/2021 0917 by Darshana Rm RN  Outcome: Progressing Towards Goal  Goal: *Home safety concerns addressed  4/23/2021 1401 by Darshana Rm RN  Outcome: Resolved/Met  4/23/2021 0917 by Darshana Rm RN  Outcome: Progressing Towards Goal  Goal: *Describes available resources and support systems  4/23/2021 1401 by Darshana Rm RN  Outcome: Resolved/Met  4/23/2021 0917 by Darshana Rm RN  Outcome: Progressing Towards Goal  Goal: *Verbalizes understanding of activation of EMS(911) for stroke symptoms(Stroke Metric)  4/23/2021 1401 by Darshana Rm RN  Outcome: Resolved/Met  4/23/2021 0917 by Darshana Rm RN  Outcome: Progressing Towards Goal  Goal: *Understands and describes signs and symptoms to report to providers(Stroke Metric)  4/23/2021 1401 by Darshana Rm RN  Outcome: Resolved/Met  4/23/2021 0917 by Darshana Rm RN  Outcome: Progressing Towards Goal  Goal: *Neurolgocially stable (absence of additional neurological deficits)  4/23/2021 1401 by Darshana Rm RN  Outcome: Resolved/Met  4/23/2021 0917 by Darshana Rm RN  Outcome: Progressing Towards Goal  Goal: Lore Cheng importance of follow-up with primary care physician(Stroke Metric)  4/23/2021 1401 by Darshana Rm RN  Outcome: Resolved/Met  4/23/2021 0917 by Darshana mR RN  Outcome: Progressing Towards Goal  Goal: *Smoking cessation discussed,if applicable(Stroke Metric)  4/23/2021 1401 by Darshana Rm RN  Outcome: Resolved/Met  4/23/2021 0917 by Darshana Rm RN  Outcome: Progressing Towards Goal  Goal: *Depression screening completed(Stroke Metric)  4/23/2021 1401 by Dunia Parisi RN  Outcome: Resolved/Met  4/23/2021 0917 by Dunia Parisi RN  Outcome: Progressing Towards Goal     Problem: Patient Education: Go to Patient Education Activity  Goal: Patient/Family Education  Outcome: Resolved/Met

## 2021-04-23 NOTE — PROGRESS NOTES
SPEECH LANGUAGE PATHOLOGY: DYSPHAGIA- Initial Assessment    NAME/AGE/GENDER: Niko Bryant is a 66 y.o. male  DATE: 4/23/2021  PRIMARY DIAGNOSIS: Stroke [facility]) [I63.9]      ICD-10: Treatment Diagnosis: R13.12 Dysphagia, Oropharyngeal Phase    RECOMMENDATIONS   DIET:    Regular Consistency   Thin Liquids    MEDICATIONS: With liquid     ASPIRATION PRECAUTIONS  · Slow rate of intake  · Small bites/sips  · Upright at 90 degrees during meal     COMPENSATORY STRATEGIES/MODIFICATIONS  · None     RECOMMENDATIONS for CONTINUED SPEECH THERAPY:   YES: Anticipate need for ongoing speech therapy during this hospitalization. ASSESSMENT   Patient presents with oropharyngeal swallow function that is within normal limits. No s/sx of dysphagia identified. Recommend continue regular consistency diet/thin liquids. Meds with liquid rinse. EDUCATION:  · Recommendations discussed with Patient, Family and RN  CONTINUATION OF SKILLED SERVICES/MEDICAL NECESSITY:   Patient continues to require skilled intervention due to need for cognitive linguistic assessment. MRI concerning for acute infarction. Yeyo BURNETT POTENTIAL FOR STATED GOALS: Excellent    PLAN    FREQUENCY/DURATION: Assessment of cognitive-linguistic abilities to be completed at next session. Frequency and duration to be determined by findings/recommendations.     - Recommendations for next treatment session: Next treatment session will address assessment of cognitive-linguistic abilities     SUBJECTIVE   Patient alert upright in bed for assessment. Friendly and pleasant. Wife at bedside. Oxygen Device: room air  Pain: Pain Scale 1: Numeric (0 - 10)  Pain Intensity 1: 0    History of Present Injury/Illness: Mr. Saba Cosme  has a past medical history of Hypertension and Other Ill-Defined Conditions. . He also  has a past surgical history that includes pr abdomen surgery proc unlisted; hx prostatectomy; and hx orthopaedic.  PRECAUTIONS/ALLERGIES: Patient has no known allergies. Problem List:  (Impairments causing functional limitations):  1. Oropharyngeal dysphagia    Previous Dysphagia: NONE REPORTED  Diet Prior to Evaluation: Regular consistency/thin liquids    Orientation:  Person  Place  Time  Situation    Cognitive-Linguistic Screening:   Alertness  o Alert   Speech Production:   o Fully intelligible   Expressive Language:  o Fluent speech and Able to effectively communicate wants and needs   Receptive Language:  o Answers yes/no questions appropriately and Follows commands   Cognition:   o No deficits identified. Answers questions appropriately and attends to task. Prior Level of Function: Live at home with wife. Recommendations: Given results of screening, further evaluation is indicated to assess cognitive linguistic function as MRI is concerning for acute infarction. .    OBJECTIVE   Oral Motor:   · Labial: No impairment  · Dentition: Intact and Natural  · Oral Hygiene: Adequate  · Lingual: No impairment    Dysphagia evaluation:   Patient consumed trials of thin by cup/straw/serial sips, mixed, and solid consistency without overt s/sx airway compromise. Timely swallow upon palpation with thin liquids. Prompt oral prep and clearance with all consistencies.     Tool Used: Dysphagia Outcome and Severity Scale (MAYRA)    Score Comments   Normal Diet  [x] 7 With no strategies or extra time needed   Functional Swallow  [] 6 May have mild oral or pharyngeal delay   Mild Dysphagia  [] 5 Which may require one diet consistency restricted    Mild-Moderate Dysphagia  [] 4 With 1-2 diet consistencies restricted   Moderate Dysphagia  [] 3 With 2 or more diet consistencies restricted   Moderate-Severe Dysphagia  [] 2 With partial PO strategies (trials with ST only)   Severe Dysphagia  [] 1 With inability to tolerate any PO safely      Score:  Initial: 7 Most Recent: x (Date 04/23/21 )   Interpretation of Tool: The Dysphagia Outcome and Severity Scale (MAYRA) is a simple, easy-to-use, 7-point scale developed to systematically rate the functional severity of dysphagia based on objective assessment and make recommendations for diet level, independence level, and type of nutrition. Current Medications:   No current facility-administered medications on file prior to encounter. Current Outpatient Medications on File Prior to Encounter   Medication Sig Dispense Refill    diclofenac (VOLTAREN) 1 % gel Apply 1 g to affected area four (4) times daily.  acetaminophen (TYLENOL) 325 mg tablet Take 1,300 mg by mouth three (3) times daily.  amLODIPine (Norvasc) 2.5 mg tablet Take 2.5 mg by mouth daily.  aspirin 81 mg chewable tablet Take 1 Tab by mouth daily. 30 Tab 5    SIMVASTATIN PO Take 40 mg by mouth.  Hydrochlorothiazide 12.5 mg tablet Take 12.5 mg by mouth daily.  valsartan (DIOVAN) 160 mg tablet Take  by mouth daily. Take one half tab by mouth every day       cetirizine (ZYRTEC) 10 mg tablet Take 10 mg by mouth daily. Take one table daily       clopidogrel (PLAVIX) 75 mg tablet Take 1 Tab by mouth daily.  30 Tab 5        INTERDISCIPLINARY COLLABORATION: RN    After treatment position/precautions:  · Upright in bed  · Wife at bedside  · RN notified    Total Treatment Duration:   Time In: 5860  Time Out: [address], [id number] [facility]

## 2021-04-23 NOTE — PROGRESS NOTES
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 Presented as Code S with dizziness. CTA of head/neck negative for LVO. TTE negative for PFO or atrial enlargement. MRI of brain  diffusion-weighted images show subtle diffusion restriction in the medulla. Orthostatic BP negative. Continue DAPT with ASA 81 mg po daily and Plavix 75 mg po daily for 21 days, then recommend monotherapy with ASA. Plan:     · Continue DAPT with ASA 81 mg po daily and Plavix 75 mg po daily for 21 days, then recommend monotherapy with ASA. · Continue intensity statin   · Neurochecks Q4H  · Telemetry   · PT/OT/ST  · DVT prophylaxis   · BP management - normotensive, with long-term goal <140/90  · Smoking cessation if applicable   · Diabetes education if applicable   · Depression Screening prior to discharge  · Neurology follow up in 3-4 weeks after discharge with Dr. Reny Torres or Shaquille Wan NP in the outpatient clinic at [address] [place] [address]. Subjective: Interval history:    Continues to endorse intermittent dizziness with ambulation to restroom this morning. Working with OT. TTE negative for PFO or atrial enlargement. MRI of brain  diffusion-weighted images show subtle diffusion restriction in the Medulla. Orthostatic BP negative. Continue DAPT. History:    Sin Kohler is a 66 y.o. male who is being seen for stroke. Review of systems negative with exception of pertinent positives and negatives noted above.        Objective:     Vitals:    04/23/21 0400 04/23/21 0800 04/23/21 0921 04/23/21 1200   BP: 117/68 122/78 139/89 137/82   Pulse: (!) 58 64  60   Resp: 18 18  17   Temp: 98 °F (36.7 °C) 98.1 °F (36.7 °C)  97.9 °F (36.6 °C)   SpO2: 97% 96%  95%   Weight:       Height:              Current Facility-Administered Medications:     clopidogreL (PLAVIX) tablet 75 mg, 75 mg, Oral, DAILY, Billy Garcia DO, 75 mg at 04/23/21 0908    acetaminophen (TYLENOL) tablet 1,300 mg, 1,300 mg, Oral, TID, Craft, Indira C, DO, 1,300 mg at 04/23/21 0908    aspirin chewable tablet 81 mg, 81 mg, Oral, DAILY, Craft, Barr Barrier, DO, 81 mg at 04/23/21 0908    loratadine (CLARITIN) tablet 10 mg, 10 mg, Oral, DAILY, Craft, Indira C, DO, 10 mg at 04/23/21 0908    atorvastatin (LIPITOR) tablet 40 mg, 40 mg, Oral, QHS, Craft, Indira C, DO, 40 mg at 04/22/21 2220    sodium chloride (NS) flush 5-40 mL, 5-40 mL, IntraVENous, Q8H, Craft, Indira C, DO, 10 mL at 04/22/21 1721    sodium chloride (NS) flush 5-40 mL, 5-40 mL, IntraVENous, PRN, Craft, Indira C, DO    hydrALAZINE (APRESOLINE) 20 mg/mL injection 20 mg, 20 mg, IntraVENous, Q6H PRN, Craft, Indiar C, DO    sodium chloride (NS) flush 5-40 mL, 5-40 mL, IntraVENous, Q8H, Craft, Indira C, DO, 10 mL at 04/23/21 0631    sodium chloride (NS) flush 5-40 mL, 5-40 mL, IntraVENous, PRN, Craft, Indira C, DO    polyethylene glycol (MIRALAX) packet 17 g, 17 g, Oral, DAILY PRN, Craft, Indira C, DO    promethazine (PHENERGAN) tablet 12.5 mg, 12.5 mg, Oral, Q6H PRN **OR** ondansetron (ZOFRAN) injection 4 mg, 4 mg, IntraVENous, Q6H PRN, Craft, Indira C, DO    Recent Results (from the past 12 hour(s))   LIPID PANEL    Collection Time: 04/23/21  5:32 AM   Result Value Ref Range    LIPID PROFILE          Cholesterol, total 142 <200 MG/DL    Triglyceride 202 (H) 35 - 150 MG/DL    HDL Cholesterol 36 (L) 40 - 60 MG/DL    LDL, calculated 65.6 <100 MG/DL    VLDL, calculated 40.4 (H) 6.0 - 23.0 MG/DL    CHOL/HDL Ratio 3.9     HEMOGLOBIN A1C WITH EAG    Collection Time: 04/23/21  5:32 AM   Result Value Ref Range    Hemoglobin A1c 5.7 4.20 - 6.30 %    Est. average glucose 559 mg/dL   METABOLIC PANEL, BASIC    Collection Time: 04/23/21  5:32 AM   Result Value Ref Range    Sodium 140 138 - 145 mmol/L    Potassium 3.8 3.5 - 5.1 mmol/L    Chloride 108 (H) 98 - 107 mmol/L    CO2 28 21 - 32 mmol/L Anion gap 4 (L) 7 - 16 mmol/L    Glucose 106 (H) 65 - 100 mg/dL    BUN 20 8 - 23 MG/DL    Creatinine 0.99 0.8 - 1.5 MG/DL    GFR est AA >60 >60 ml/min/1.73m2    GFR est non-AA >60 >60 ml/min/1.73m2    Calcium 8.5 8.3 - 10.4 MG/DL   CBC WITH AUTOMATED DIFF    Collection Time: 04/23/21  5:32 AM   Result Value Ref Range    WBC 4.6 4.3 - 11.1 K/uL    RBC 4.97 4.23 - 5.6 M/uL    HGB 14.9 13.6 - 17.2 g/dL    HCT 43.9 41.1 - 50.3 %    MCV 88.3 79.6 - 97.8 FL    MCH 30.0 26.1 - 32.9 PG    MCHC 33.9 31.4 - 35.0 g/dL    RDW 13.0 11.9 - 14.6 %    PLATELET 305 002 - 563 K/uL    MPV 9.6 9.4 - 12.3 FL    ABSOLUTE NRBC 0.00 0.0 - 0.2 K/uL    DF AUTOMATED      NEUTROPHILS 53 43 - 78 %    LYMPHOCYTES 32 13 - 44 %    MONOCYTES 12 4.0 - 12.0 %    EOSINOPHILS 2 0.5 - 7.8 %    BASOPHILS 1 0.0 - 2.0 %    IMMATURE GRANULOCYTES 0 0.0 - 5.0 %    ABS. NEUTROPHILS 2.4 1.7 - 8.2 K/UL    ABS. LYMPHOCYTES 1.5 0.5 - 4.6 K/UL    ABS. MONOCYTES 0.6 0.1 - 1.3 K/UL    ABS. EOSINOPHILS 0.1 0.0 - 0.8 K/UL    ABS. BASOPHILS 0.0 0.0 - 0.2 K/UL    ABS. IMM. GRANS. 0.0 0.0 - 0.5 K/UL     MRI Results (most recent):  Results from Hospital Encounter encounter on 04/22/21   MRI BRAIN WO CONT    Narrative MRI BRAIN WITHOUT CONTRAST, 4/22/2021    History: Acute onset of dizziness. Comparison:  CT head 4/22/2021    Technique:  Sagittal T1, axial T1, T2, FLAIR, diffusion with ADC map were  obtained. Findings: Diffusion-weighted images show subtle diffusion restriction in the  medulla best appreciated on diffusion-weighted image 6 concerning for a lacunar  infarction at this level. Mild chronic appearing periventricular white matter  microvascular ischemic changes are seen. No abnormal signal changes are seen on  gradient recalled echo images, T1-weighted images, or T2 FLAIR images to suggest  potential acute hemorrhage. No evidence for acute hydrocephalus. No abnormal  extra axial fluid collections are seen. Orbits show no gross abnormality. No  inflammatory changes are noted the paranasal sinuses, middle ears and mastoid  air cells. Impression 1. Subtle diffusion restricted suggested in the midbrain. An acute or subacute  lacunar infarction is not excluded. Physical Exam:  General - Well developed, well nourished, in no apparent distress. Pleasant and conversent. HEENT - Normocephalic, atraumatic. Conjunctiva, tympanic membranes, and oropharynx are clear. Neck - Supple without masses, no bruits   Cardiovascular - Regular rate and rhythm. Normal S1, S2 without murmurs, rubs, or gallops. Lungs - Clear to auscultation. Abdomen - Soft, nontender with normal bowel sounds. Extremities - Peripheral pulses intact. No edema and no rashes.      Neurological examination - Comprehension, attention, memory and reasoning are intact. Language and speech are normal.   On cranial nerve examination, (II, III, IV, VI) pupils are equal, round, and reactive to light. Fundoscopic exam is normal. Visual acuity is adequate. Visual fields are full to finger confrontation. Extraocular motility is normal. (V, VII) Face is symmetric and sensation is intact to light touch. (VIII) Hearing is intact. (IX, X) Palate and uvula elevate symmetrically. Voice is normal. (XI) Shoulder shrug is strong and equal bilaterally. (XII)Tongue is midline. Motor examination - There is normal muscle tone and bulk. Power is full throughout. Muscle stretch reflexes are normoactive and there are no pathological reflexes present. Plantar response is flexor. Sensation is intact to light touch, pinprick, vibration and proprioception in all extremities. Cerebellar examination is normal. Gait/stance not assessed.     Signed By: MARY ANNE Yin     April 23, 2021

## 2021-04-23 NOTE — PROGRESS NOTES
Problem: Falls - Risk of  Goal: *Absence of Falls  Description: Document Onalee Gum Fall Risk and appropriate interventions in the flowsheet.   Outcome: Progressing Towards Goal  Note: Fall Risk Interventions:  Mobility Interventions: Bed/chair exit alarm, Communicate number of staff needed for ambulation/transfer, Patient to call before getting OOB              Elimination Interventions: Bed/chair exit alarm, Call light in reach, Patient to call for help with toileting needs, Stay With Me (per policy), Toilet paper/wipes in reach, Toileting schedule/hourly rounds, Urinal in reach              Problem: TIA/CVA Stroke: 0-24 hours  Goal: Activity/Safety  Outcome: Progressing Towards Goal  Goal: Consults, if ordered  Outcome: Progressing Towards Goal  Goal: Diagnostic Test/Procedures  Outcome: Progressing Towards Goal  Goal: Nutrition/Diet  Outcome: Progressing Towards Goal  Goal: Discharge Planning  Outcome: Progressing Towards Goal  Goal: Medications  Outcome: Progressing Towards Goal  Goal: Respiratory  Outcome: Progressing Towards Goal  Goal: Treatments/Interventions/Procedures  Outcome: Progressing Towards Goal  Goal: Minimize risk of bleeding post-thrombolytic infusion  Outcome: Progressing Towards Goal  Goal: Monitor for complications post-thrombolytic infusion  Outcome: Progressing Towards Goal  Goal: Psychosocial  Outcome: Progressing Towards Goal  Goal: *Hemodynamically stable  Outcome: Progressing Towards Goal  Goal: *Neurologically stable  Description: Absence of additional neurological deficits    Outcome: Progressing Towards Goal  Goal: *Verbalizes anxiety and depression are reduced or absent  Outcome: Progressing Towards Goal  Goal: *Absence of Signs of Aspiration on Current Diet  Outcome: Progressing Towards Goal  Goal: *Absence of deep venous thrombosis signs and symptoms(Stroke Metric)  Outcome: Progressing Towards Goal  Goal: *Ability to perform ADLs and demonstrates progressive mobility and function  Outcome: Progressing Towards Goal  Goal: *Stroke education started(Stroke Metric)  Outcome: Progressing Towards Goal  Goal: *Dysphagia screen performed(Stroke Metric)  Outcome: Progressing Towards Goal  Goal: *Rehab consulted(Stroke Metric)  Outcome: Progressing Towards Goal     Problem: TIA/CVA Stroke: Day 2 Until Discharge  Goal: Activity/Safety  Outcome: Progressing Towards Goal  Goal: Diagnostic Test/Procedures  Outcome: Progressing Towards Goal  Goal: Nutrition/Diet  Outcome: Progressing Towards Goal  Goal: Discharge Planning  Outcome: Progressing Towards Goal  Goal: Medications  Outcome: Progressing Towards Goal  Goal: Respiratory  Outcome: Progressing Towards Goal  Goal: Treatments/Interventions/Procedures  Outcome: Progressing Towards Goal  Goal: Psychosocial  Outcome: Progressing Towards Goal  Goal: *Verbalizes anxiety and depression are reduced or absent  Outcome: Progressing Towards Goal  Goal: *Absence of aspiration  Outcome: Progressing Towards Goal  Goal: *Absence of deep venous thrombosis signs and symptoms(Stroke Metric)  Outcome: Progressing Towards Goal  Goal: *Optimal pain control at patient's stated goal  Outcome: Progressing Towards Goal  Goal: *Tolerating diet  Outcome: Progressing Towards Goal  Goal: *Ability to perform ADLs and demonstrates progressive mobility and function  Outcome: Progressing Towards Goal  Goal: *Stroke education continued(Stroke Metric)  Outcome: Progressing Towards Goal     Problem: Ischemic Stroke: Discharge Outcomes  Goal: *Verbalizes anxiety and depression are reduced or absent  Outcome: Progressing Towards Goal  Goal: *Verbalize understanding of risk factor modification(Stroke Metric)  Outcome: Progressing Towards Goal  Goal: *Hemodynamically stable  Outcome: Progressing Towards Goal  Goal: *Absence of aspiration pneumonia  Outcome: Progressing Towards Goal  Goal: *Aware of needed dietary changes  Outcome: Progressing Towards Goal  Goal: *Verbalize understanding of prescribed medications including anti-coagulants, anti-lipid, and/or anti-platelets(Stroke Metric)  Outcome: Progressing Towards Goal  Goal: *Tolerating diet  Outcome: Progressing Towards Goal  Goal: *Aware of follow-up diagnostics related to anticoagulants  Outcome: Progressing Towards Goal  Goal: *Ability to perform ADLs and demonstrates progressive mobility and function  Outcome: Progressing Towards Goal  Goal: *Absence of DVT(Stroke Metric)  Outcome: Progressing Towards Goal  Goal: *Absence of aspiration  Outcome: Progressing Towards Goal  Goal: *Optimal pain control at patient's stated goal  Outcome: Progressing Towards Goal  Goal: *Home safety concerns addressed  Outcome: Progressing Towards Goal  Goal: *Describes available resources and support systems  Outcome: Progressing Towards Goal  Goal: *Verbalizes understanding of activation of EMS(911) for stroke symptoms(Stroke Metric)  Outcome: Progressing Towards Goal  Goal: *Understands and describes signs and symptoms to report to providers(Stroke Metric)  Outcome: Progressing Towards Goal  Goal: *Neurolgocially stable (absence of additional neurological deficits)  Outcome: Progressing Towards Goal  Goal: *Verbalizes importance of follow-up with primary care physician(Stroke Metric)  Outcome: Progressing Towards Goal  Goal: *Smoking cessation discussed,if applicable(Stroke Metric)  Outcome: Progressing Towards Goal  Goal: *Depression screening completed(Stroke Metric)  Outcome: Progressing Towards Goal

## 2021-04-23 NOTE — PROGRESS NOTES
04/23/21 0641   NIH Stroke Scale   Interval Other (comment)  (Dual shift chaneg [facility]. )   LOC 0   LOC Questions 0   LOC Commands 0   Best Gaze 0   Visual 0   Facial Palsy 0   Motor Right Arm 0   Motor Left Arm 0   Motor Right Leg 0   Motor Left Leg 0   Limb Ataxia 0   Sensory 0   Best Language 0   Dysarthria 0   Extinction and Inattention 0   Total 0     Dual shift change [facility] with Luis Daniel Jones RN.

## 2021-04-23 NOTE — PROGRESS NOTES
ACUTE PHYSICAL THERAPY GOALS:  (Developed with and agreed upon by patient and/or caregiver. )  LTG:  (1.)Mr. Eli Bledsoe will move from supine to sit and sit to supine , scoot up and down and roll side to side in bed with MODIFIED INDEPENDENCE within 5 treatment day(s). (2.)Mr. Eli Bledsoe will transfer from bed to chair and chair to bed with MODIFIED INDEPENDENCE using the least restrictive device within 5 treatment day(s). (3.)Mr. Eli Bledsoe will ambulate with MODIFIED INDEPENDENCE for 500 feet with the least restrictive device within 5 treatment day(s). (4.)Mr. Eli Bledsoe will perform standing static and dynamic balance activities x 15 minutes with MODIFIED INDEPENDENCE to improve safety within 5 treatment day(s). (5.)Mr. Eli Bledsoe will ambulate and/or perform functional activities for 15 consecutive minutes with stable vital signs and no rests required to improve activity tolerance within 5 treatment days.   ________________________________________________________________________________________________    PHYSICAL THERAPY ASSESSMENT: Initial Assessment, Daily Note and PM PT Treatment Day # 1      Carolynn Henry is a 66 y.o. male   PRIMARY DIAGNOSIS: <principal problem not specified>  Stroke ([facility] [I63.9]       Reason for Referral:  CVA workup  ICD-10: Treatment Diagnosis: Generalized Muscle Weakness (M62.81)  Difficulty in walking, Not elsewhere classified (R26.2)  [place]PAT[place] / [place] / Product Type: Medicare /     ASSESSMENT:     REHAB RECOMMENDATIONS:   Recommendation to date pending progress:  Setting:   Outpatient Therapy vs no needs  Equipment:    None     PRIOR LEVEL OF FUNCTION:  (Prior to Hospitalization) INITIAL/CURRENT LEVEL OF FUNCTION:  (Most Recently Demonstrated)   Bed Mobility:   Independent  Sit to Stand:   Independent  Transfers:   Independent  Gait/Mobility:   Independent Bed Mobility:   Modified Independent  Sit to Stand:   Modified Independent  Transfers:   Modified Independent  Gait/Mobility:   Supervision     ASSESSMENT:  Mr. Maria De Jesus Fenton presents with decreased strength, decreased activity tolerance and impaired balance which are impacting his ability to perform ambulation at their baseline level of mobility. He currently requires supervision to complete ambulation with mild balance impairments. Patient presents with excellent rehab potential secondary to previous level of function. Ree Client is currently functioning below his baseline and would benefit from skilled PT during acute care stay to maximize safety and independence with functional mobility. Educated on signs/symptoms of CVA. SUBJECTIVE:   Mr. Maria De Jesus Fenton states, \"I am feeling much better than yesterday. \"    SOCIAL HISTORY/LIVING ENVIRONMENT: Patient lives with spouse and typically independent. Has had 1 fall in the past 6 months. Able to perform yardwork normally and works.       OBJECTIVE:     PAIN: VITAL SIGNS: LINES/DRAINS:   Pre Treatment: Pain Screen  Pain Scale 1: Numeric (0 - 10)  Pain Intensity 1: 0  Post Treatment: 0     O2 Device: None (Room air)     GROSS EVALUATION:  BLE Within Functional Limits Abnormal/ Functional Abnormal/ Non-Functional (see comments) Not Tested Comments:   AROM [] [x] [] [] B knee limited extension   PROM [] [x] [] [] B knee limited extension   Strength [] [x] [] []    Balance [] [x] [] []    Posture [] [x] [] []    Sensation [x] [] [] []    Coordination [x] [] [] []    Tone [x] [] [] []    Edema [x] [] [] []    Activity Tolerance [x] [] [] []     [] [] [] []      COGNITION/  PERCEPTION: Intact Impaired   (see comments) Comments:   Orientation [x] []    Vision [x] []    Hearing [x] []    Command Following [x] []    Safety Awareness [x] []     [] []      MOBILITY: I Mod I S SBA CGA Min Mod Max Total  NT x2 Comments:   Bed Mobility    Rolling [] [x] [] [] [] [] [] [] [] [] []    Supine to Sit [] [x] [] [] [] [] [] [] [] [] []    Scooting [] [x] [] [] [] [] [] [] [] [] []    Sit to Supine [] [x] [] [] [] [] [] [] [] [] []    Transfers    Sit to Stand [] [] [x] [] [] [] [] [] [] [] []    Bed to Chair [] [] [] [] [] [] [] [] [] [] []    Stand to Sit [] [] [x] [] [] [] [] [] [] [] []    I=Independent, Mod I=Modified Independent, S=Supervision, SBA=Standby Assistance, CGA=Contact Guard Assistance,   Min=Minimal Assistance, Mod=Moderate Assistance, Max=Maximal Assistance, Total=Total Assistance, NT=Not Tested  GAIT: I Mod I S SBA CGA Min Mod Max Total  NT x2 Comments:   Level of Assistance [] [] [x] [] [] [] [] [] [] [] []    Distance 500'    DME None    Gait Quality Decreased knee extension BLE    Weightbearing Status N/A     I=Independent, Mod I=Modified Independent, S=Supervision, SBA=Standby Assistance, CGA=Contact Guard Assistance,   Min=Minimal Assistance, Mod=Moderate Assistance, Max=Maximal Assistance, Total=Total Assistance, NT=Not Tested    [facility] Form       How much difficulty does the patient currently have. .. Unable A Lot A Little None   1. Turning over in bed (including adjusting bedclothes, sheets and blankets)? [] 1   [] 2   [] 3   [x] 4   2. Sitting down on and standing up from a chair with arms ( e.g., wheelchair, bedside commode, etc.)   [] 1   [] 2   [] 3   [x] 4   3. Moving from lying on back to sitting on the side of the bed? [] 1   [] 2   [] 3   [x] 4   How much help from another person does the patient currently need. .. Total A Lot A Little None   4. Moving to and from a bed to a chair (including a wheelchair)? [] 1   [] 2   [] 3   [x] 4   5. Need to walk in hospital room? [] 1   [] 2   [x] 3   [] 4   6. Climbing 3-5 steps with a railing? [] 1   [] 2   [x] 3   [] 4   © 2007, Trustees of [facility] [employer], under license to [employer] [facility].  All rights reserved     Score:  Initial: 22 Most Recent: X (Date: -- )    Interpretation of Tool:  Represents activities that are increasingly more difficult (i.e. Bed mobility, Transfers, Gait). PLAN:   FREQUENCY/DURATION: PT Plan of Care: 3 times/week for duration of hospital stay or until stated goals are met, whichever comes first.    PROBLEM LIST:   (Skilled intervention is medically necessary to address:)  1. Decreased AROM/PROM  2. Decreased Balance  3. Decreased Gait Ability  4. Decreased Strength  5. Decreased Transfer Abilities   INTERVENTIONS PLANNED:   (Benefits and precautions of physical therapy have been discussed with the patient.)  1. Therapeutic Activity  2. Therapeutic Exercise/HEP  3. Neuromuscular Re-education  4. Gait Training  5. Manual Therapy  6. Education     TREATMENT:     EVALUATION: Low Complexity : (Untimed Charge)    TREATMENT:   ($$ Therapeutic Activity: 8-22 mins    )  Therapeutic Activity (8 Minutes): Therapeutic activity included Ambulation on level ground to improve functional Mobility and Strength.     TREATMENT GRID:  N/A    AFTER TREATMENT POSITION/PRECAUTIONS:  Bed, Needs within reach, RN notified and Visitors at bedside    INTERDISCIPLINARY COLLABORATION:  RN/PCT and PT/PTA    TOTAL TREATMENT DURATION:  PT Patient Time In/Time Out  Time In: 1032  Time Out: [facility], PT, DPT

## 2021-04-23 NOTE — DISCHARGE SUMMARY
Hospitalist Discharge Summary     Patient ID:  Joanne Guan  [id number]  22 y.o.  1950  Admit date: 4/22/2021 11:46 AM  Discharge date and time: 4/23/2021  Attending: Krystyna Bradley DO  PCP:  Héctor Herrera MD  Treatment Team: Attending Provider: Krystyna Bradley DO; Consulting Provider: Lachelle Flynn DO; Consulting Provider: Summer Jacinto MD; Utilization Review: Dhruv Kowalski, RN; Physical Therapist: Ann Guan, PT, DPT; Occupational Therapist: Robbi Reddy OTR/L    Principal Diagnosis <principal problem not specified>   Active Problems:    Essential hypertension (10/3/2009)      Hyperlipemia (10/3/2009)      Stroke [facility]) (4/22/2021)     Hospital Course: \"70 y.o. male with medical history of hypertension, hyperlipidemia, distant history of TIA who presented to ED with complaints of dizziness. Patient states he has been feeling dizzy for the last 3 mornings upon waking up. Patient reports that his dizziness first 2 days lasted only a few minutes of the time he walked out of his bedroom and had resolved. He cannot characterize dizziness is spinning but seems more a lightheaded sensation. This morning he reports this issue was much worse in the previous 2 days. Reports his dizziness lasted for a few hours. Says he was having trouble coordinating his legs while trying to walk. Denies any changes in vision, headache, fever, chills, difficulty with speech or swallow. No changes in medications recently. Reports a similar episode more than a decade ago and was diagnosed as having a TIA. Says he still takes his aspirin but has not been taking his Plavix. \" Cholesterol 142, LDL 65.6, A1C 5.7. TSH normal. CTA head neck with no large vessel occlusive stenosis, dominate right thyroid nodule. Recommend outpatient thyroid US. MRI brain showed subtle diffusion restricted suggested in the midbrain. An acute or subacute lacunar infarction is not excluded.  ECHO showed EF 55%, RV pressure 30-35mmHg, no shunting and left atrial size normal. Neurology consulted who recommended ASA and plavix for 21 days then just take ASA daily. Increased amlodipine to 5mg daily to help with BP control at home. Goal -125/80.      If worsening AMS, chest pain, or SOB, please come back to the ED    Please refer to the admission H&P for details of presentation. In summary, the patient is stable for discharge. Significant Diagnostic Studies:       Labs: Results:       Chemistry Recent Labs     04/23/21  0532 04/22/21  1149   * 84    142   K 3.8 4.1   * 109*   CO2 28 29   BUN 20 26*   CREA 0.99 0.96   CA 8.5 9.4   AGAP 4* 4*      CBC w/Diff Recent Labs     04/23/21  0532 04/22/21  1149   WBC 4.6 4.8   RBC 4.97 5.13   HGB 14.9 15.4   HCT 43.9 45.4    186   GRANS 53 45   LYMPH 32 40   EOS 2 2      Cardiac Enzymes No results for input(s): CPK, CKND1, MAURICIO in the last 72 hours. No lab exists for component: CKRMB, TROIP   Coagulation Recent Labs     04/22/21  1150 04/22/21  1149   PTP  --  13.4   INR 1.0 1.0       Lipid Panel Lab Results   Component Value Date/Time    Cholesterol, total 142 04/23/2021 05:32 AM    HDL Cholesterol 36 (L) 04/23/2021 05:32 AM    LDL, calculated 65.6 04/23/2021 05:32 AM    VLDL, calculated 40.4 (H) 04/23/2021 05:32 AM    Triglyceride 202 (H) 04/23/2021 05:32 AM    CHOL/HDL Ratio 3.9 04/23/2021 05:32 AM      BNP No results for input(s): BNPP in the last 72 hours. Liver Enzymes No results for input(s): TP, ALB, TBIL, AP in the last 72 hours.     No lab exists for component: SGOT, GPT, DBIL   Thyroid Studies Lab Results   Component Value Date/Time    TSH 0.948 04/22/2021 04:49 PM            Discharge Exam:  Visit Vitals  /82 (BP 1 Location: Right arm, BP Patient Position: At rest)   Pulse 60   Temp 97.9 °F (36.6 °C)   Resp 17   Ht 5' 7\" (1.702 m)   Wt 90.3 kg (199 lb)   SpO2 95%   BMI 31.17 kg/m²     General appearance: alert, cooperative, no distress, appears stated age  Lungs: clear to auscultation bilaterally  Heart: regular rate and rhythm, S1, S2 normal, no murmur  Abdomen: soft, non-tender. Bowel sounds normal.   Extremities: no cyanosis or edema. 5/5 strength to UE and LE bilaterally   Neurologic: Grossly normal    Disposition:Home   Discharge Condition: stable  Patient Instructions: As above   Current Discharge Medication List      CONTINUE these medications which have CHANGED    Details   clopidogreL (PLAVIX) 75 mg tab Take 1 Tab by mouth daily. Qty: 30 Tab, Refills: 0      amLODIPine (Norvasc) 5 mg tablet Take 1 Tab by mouth daily. Qty: 30 Tab, Refills: 0         CONTINUE these medications which have NOT CHANGED    Details   diclofenac (VOLTAREN) 1 % gel Apply 1 g to affected area four (4) times daily. acetaminophen (TYLENOL) 325 mg tablet Take 1,300 mg by mouth three (3) times daily. aspirin 81 mg chewable tablet Take 1 Tab by mouth daily. Qty: 30 Tab, Refills: 5      SIMVASTATIN PO Take 40 mg by mouth. Hydrochlorothiazide 12.5 mg tablet Take 12.5 mg by mouth daily. valsartan (DIOVAN) 160 mg tablet Take  by mouth daily. Take one half tab by mouth every day       cetirizine (ZYRTEC) 10 mg tablet Take 10 mg by mouth daily. Take one table daily              Activity:Up and melvin   Diet:Cardiac   Wound Care:None     Follow-up PCP in one week. Neurology in three weeks.    ·     Time spent to discharge patient 35 minutes  Signed:  Manda Dean DO  4/23/2021  12:55 PM

## 2021-04-23 NOTE — PROGRESS NOTES
ACUTE OT GOALS:  (Developed with and agreed upon by patient and/or caregiver.)  1. Patient will complete shower with modified independence. 2. Patient will complete toileting with modified independence. .   3. Patient will tolerate 30 minutes of OT treatment with no rest breaks to increase activity tolerance for ADLs. 4. Patient will complete functional transfers and functional mobility of community distances independently. 5. Patient will verbalize at least 3 warning signs of stroke. Timeframe: 7 visits       OCCUPATIONAL THERAPY ASSESSMENT: Initial Assessment and Daily Note OT Treatment Day # 1    Thanh Monday is a 66 y.o. male   PRIMARY DIAGNOSIS: <principal problem not specified>  Stroke [facility]) [I63.9]       Reason for Referral:  CVA, dizziness, difficulty walking   ICD-10: Treatment Diagnosis: Dizziness and Giddiness (R42)  [place]PAT[place] / [place] B / Product Type: Medicare /   ASSESSMENT:     REHAB RECOMMENDATIONS:   Recommendation to date pending progress:  Setting:   No further skilled therapy   Equipment:    None     PRIOR LEVEL OF FUNCTION:  (Prior to Hospitalization)  INITIAL/CURRENT LEVEL OF FUNCTION:  (Based on today's evaluation)   Bathing:   Independent  Dressing:   Independent  Feeding/Grooming:   Independent  Toileting:   Independent  Functional Mobility:   Independent Bathing:   Supervision  Dressing:   Set Up  Feeding/Grooming:   Modified Independent  Toileting:   Modified Independent  Functional Mobility:   Supervision     ASSESSMENT:  Mr. Gino Choudhary is a 66year old male admitted with dizziness. MRI showed possible acute/ subacute lacunar infarct. At baseline patient lives with spouse and is independent & active. Today, endorses mild dizziness at rest and with movement. Orthostatic BP negative (see below). Educated patient on warning signs and symptoms of CVA.  Likely functioning close to baseline, do not anticipate any OT needs post-discharge. SUBJECTIVE:   Mr. David Hartman states, \"I got 22,000 steps day before yesterday. \"    SOCIAL HISTORY/LIVING ENVIRONMENT: Lives with wife. Independent with all ADLs, IADLs. Drives. Golfs. Retired.  L handed        OBJECTIVE:     PAIN: VITAL SIGNS: LINES/DRAINS:   Pre Treatment: Pain Screen  Pain Scale 1: Numeric (0 - 10)  Pain Intensity 1: 0  Post Treatment: 0 Vital Signs  BP: 139/89  MAP (Calculated): 106  BP Patient Position: Supine  More BP/Pulse rows needed?: Yes  Additional Blood Pressure/Pulse Data  BP 2: 148/52  MAP 2 (Calculated): 84  Patient Position 2: Sitting  BP 3: 159/84  MAP 3 (Calculated): 109  Patient Position 3: Standing none  O2 Device: None (Room air)     GROSS EVALUATION:  BUE Within Functional Limits Abnormal/ Functional Abnormal/ Non-Functional (see comments) Not Tested Comments:   AROM [x] [] [] []    PROM [x] [] [] []    Strength [x] [] [] [] BUE equal 5/5    Balance [] [x] [] [] Sitting- intact  Standing static- good  Standing dynamic- fair+   Posture [x] [] [] []    Sensation [x] [] [] [] To light touch and deep pressure BUE    Coordination [x] [] [] []    Tone [x] [] [] [] Normal    Edema [x] [] [] []    Activity Tolerance [x] [] [] []     [] [] [] []      COGNITION/  PERCEPTION: Intact Impaired   (see comments) Comments:   Orientation [x] [] Ox4    Vision [] [x] Contact lenses  No nystagmus  Able to track all 4 quadrants   Hearing [] [x] [place]- hearing aids    Judgment/ Insight [x] []    Attention [x] []    Memory [x] []    Command Following [x] []    Emotional Regulation [x] []     [] []      ACTIVITIES OF DAILY LIVING: I Mod I S SBA CGA Min Mod Max Total NT Comments   BASIC ADLs:              Bathing/ Showering [] [] [] [] [] [] [] [] [] [x]    Toileting [] [] [] [] [] [] [] [] [] [x]    Dressing [] [] [] [] [] [] [] [] [] [x]    Feeding [] [] [] [] [] [] [] [] [] [x]    Grooming [] [] [] [] [] [] [] [] [] [x]    Personal Device Care [x] [] [] [] [] [] [] [] [] [] mask Functional Mobility [] [] [x] [x] [] [] [] [] [] []    I=Independent, Mod I=Modified Independent, S=Supervision, SBA=Standby Assistance, CGA=Contact Guard Assistance,   Min=Minimal Assistance, Mod=Moderate Assistance, Max=Maximal Assistance, Total=Total Assistance, NT=Not Tested    MOBILITY: I Mod I S SBA CGA Min Mod Max Total  NT x2 Comments:   Supine to sit [] [x] [] [] [] [] [] [] [] [] []    Sit to supine [] [x] [] [] [] [] [] [] [] [] []    Sit to stand [] [] [] [x] [] [] [] [] [] [] []    Bed to chair [] [] [] [x] [] [] [] [] [] [] []    I=Independent, Mod I=Modified Independent, S=Supervision, SBA=Standby Assistance, CGA=Contact Guard Assistance,   Min=Minimal Assistance, Mod=Moderate Assistance, Max=Maximal Assistance, Total=Total Assistance, NT=Not [facility]   Daily Activity Inpatient Short Form        How much help from another person does the patient currently need. .. Total A Lot A Little None   1. Putting on and taking off regular lower body clothing? [] 1   [] 2   [x] 3   [] 4   2. Bathing (including washing, rinsing, drying)? [] 1   [] 2   [x] 3   [] 4   3. Toileting, which includes using toilet, bedpan or urinal?   [] 1   [] 2   [] 3   [x] 4   4. Putting on and taking off regular upper body clothing? [] 1   [] 2   [] 3   [x] 4   5. Taking care of personal grooming such as brushing teeth? [] 1   [] 2   [] 3   [x] 4   6. Eating meals? [] 1   [] 2   [] 3   [x] 4   © 2007, Trustees of [facility] [employer], under license to [employer]. All rights reserved     Score:  Initial: 22 Most Recent: X (Date: -- )   Interpretation of Tool:  Represents activities that are increasingly more difficult (i.e. Bed mobility, Transfers, Gait).     PLAN:   FREQUENCY/DURATION: OT Plan of Care: 3 times/week for duration of hospital stay or until stated goals are met, whichever comes first.    PROBLEM LIST:   (Skilled intervention is medically necessary to address:)  1. Decreased ADL/Functional Activities  2. Decreased Balance   INTERVENTIONS PLANNED:   (Benefits and precautions of occupational therapy have been discussed with the patient.)  1. Self Care Training  2. Therapeutic Activity  3. Therapeutic Exercise/HEP  4. Neuromuscular Re-education  5. Education     TREATMENT:     EVALUATION: Low Complexity : (Untimed Charge)    TREATMENT:   ( $$ Therapeutic Activity: 8-22 mins   )  Therapeutic Activity (8 Minutes): Therapeutic activity included Supine to Sit, Sit to Supine, Ambulation on level ground, Standing balance and education on warning signs and symtpoms of CVA to improve functional Mobility, Activity tolerance and balance.     TREATMENT GRID:  N/A    AFTER TREATMENT POSITION/PRECAUTIONS:  Bed, Needs within reach, RN notified and wife present    INTERDISCIPLINARY COLLABORATION:  MD/PA/NP, RN/PCT and OT/JOHNSON    TOTAL TREATMENT DURATION:  OT Patient Time In/Time Out  Time In: 0921  Time Out: 2014 [facility] RICA Lara/L

## 2022-03-19 PROBLEM — I63.9 STROKE (HCC): Status: ACTIVE | Noted: 2021-04-22

## 2023-04-03 ENCOUNTER — TELEPHONE (OUTPATIENT)
Dept: NEUROLOGY | Age: 73
End: 2023-04-03

## 2023-04-03 NOTE — TELEPHONE ENCOUNTER
Pt wife called stating her  Camron Gordon was having symptoms of stroke . Wanted to make an appointment with Dr Alden Perez or GLENROY Chang. Advised her to dial 911 or take him straight to the ER. There they would consult neurology if need be .